# Patient Record
Sex: MALE | Race: WHITE | NOT HISPANIC OR LATINO | Employment: FULL TIME | URBAN - METROPOLITAN AREA
[De-identification: names, ages, dates, MRNs, and addresses within clinical notes are randomized per-mention and may not be internally consistent; named-entity substitution may affect disease eponyms.]

---

## 2018-05-20 ENCOUNTER — APPOINTMENT (OUTPATIENT)
Dept: GENERAL RADIOLOGY | Facility: CLINIC | Age: 53
End: 2018-05-20
Attending: EMERGENCY MEDICINE
Payer: COMMERCIAL

## 2018-05-20 ENCOUNTER — HOSPITAL ENCOUNTER (EMERGENCY)
Facility: CLINIC | Age: 53
Discharge: HOME | End: 2018-05-20
Attending: EMERGENCY MEDICINE | Admitting: EMERGENCY MEDICINE
Payer: COMMERCIAL

## 2018-05-20 VITALS
OXYGEN SATURATION: 98 % | WEIGHT: 235.89 LBS | RESPIRATION RATE: 18 BRPM | DIASTOLIC BLOOD PRESSURE: 119 MMHG | TEMPERATURE: 99.7 F | SYSTOLIC BLOOD PRESSURE: 166 MMHG | BODY MASS INDEX: 29.48 KG/M2

## 2018-05-20 DIAGNOSIS — J06.9 VIRAL URI WITH COUGH: ICD-10-CM

## 2018-05-20 DIAGNOSIS — L03.115 CELLULITIS OF FOOT WITHOUT TOES, RIGHT: ICD-10-CM

## 2018-05-20 PROCEDURE — 73630 X-RAY EXAM OF FOOT: CPT | Mod: RT

## 2018-05-20 PROCEDURE — 71046 X-RAY EXAM CHEST 2 VIEWS: CPT

## 2018-05-20 PROCEDURE — 99284 EMERGENCY DEPT VISIT MOD MDM: CPT | Mod: 25

## 2018-05-20 RX ORDER — NAPROXEN 500 MG/1
500 TABLET ORAL 2 TIMES DAILY WITH MEALS
Qty: 14 TABLET | Refills: 0 | Status: SHIPPED | OUTPATIENT
Start: 2018-05-20 | End: 2018-05-27

## 2018-05-20 RX ORDER — CEPHALEXIN 500 MG/1
500 CAPSULE ORAL 4 TIMES DAILY
Qty: 40 CAPSULE | Refills: 0 | Status: SHIPPED | OUTPATIENT
Start: 2018-05-20 | End: 2018-05-30

## 2018-05-20 ASSESSMENT — ENCOUNTER SYMPTOMS
FEVER: 1
RHINORRHEA: 1
COUGH: 1
SHORTNESS OF BREATH: 1
ABDOMINAL PAIN: 0
CHILLS: 1
ARTHRALGIAS: 1
DIFFICULTY URINATING: 0

## 2018-05-20 NOTE — ED AVS SNAPSHOT
[facility] Emergency Department    [address]    [facility] [address]    Phone:  [phone]    Fax:  [phone]                                       aMrk Roy   MRN: [id number]    Department:  [facility] Emergency Department   Date of Visit:  5/20/2018           After Visit Summary Signature Page     I have received my discharge instructions, and my questions have been answered. I have discussed any challenges I see with this plan with the nurse or doctor.    ..........................................................................................................................................  Patient/Patient Representative Signature      ..........................................................................................................................................  Patient Representative Print Name and Relationship to Patient    ..................................................               ................................................  Date                                            Time    ..........................................................................................................................................  Reviewed by Signature/Title    ...................................................              ..............................................  Date                                                            Time

## 2018-05-20 NOTE — ED TRIAGE NOTES
Pt presents with off and on fever since Tuesday and R foot pain since Wednesday. Pt also endorses a cough as well with productive sputum. States is having R foot swelling and it feels like he is walking on glass. ABCs intact.

## 2018-05-20 NOTE — LETTER
May 20, 2018      To Whom It May Concern:      Mark Roy was seen in our Emergency Department today, 05/20/18.  I expect his condition to improve over the next 2-3 days.  He may return to work when improved.    Sincerely,        Stas Gonzalez MD

## 2018-05-20 NOTE — ED AVS SNAPSHOT
[facility] Emergency Department    [address]    [facility] [address]    Phone:  [phone]    Fax:  [phone]                                       Mark Roy   MRN: [id number]    Department:  [facility] Emergency Department   Date of Visit:  5/20/2018           Patient Information     Date Of Birth          1965        Your diagnoses for this visit were:     Viral URI with cough     Cellulitis of foot without toes, right        You were seen by Stas Gonzalez MD.      Follow-up Information     Follow up with Kana Tapia MD. Schedule an appointment as soon as possible for a visit in 3 days.    Specialty:  Internal Medicine    Why:  To recheck the infection and make sure it is healing.    Contact information:    [address]  [facility] [address]  [phone]          Go to [facility] Emergency Department.    Specialty:  EMERGENCY MEDICINE    Why:  If symptoms worsen    Contact information:    [address]  [facility] [id number] [phone]        Discharge Instructions         Cellulitis  Cellulitis is an infection of the deep layers of skin. A break in the skin, such as a cut or scratch, can let bacteria under the skin. If the bacteria get to deep layers of the skin, it can be serious. If not treated, cellulitis can get into the bloodstream and lymph nodes. The infection can then spread throughout the body. This causes serious illness.  Cellulitis causes the affected skin to become red, swollen, warm, and sore. The reddened areas have a visible border. An open sore may leak fluid (pus). You may have a fever, chills, and pain.  Cellulitis is treated with antibiotics taken for 7 to 10 days. An open sore may be cleaned and covered with cool wet gauze. Symptoms should get better 1 to 2 days after treatment is started. Make sure to take all the antibiotics for the full number of days until they are gone. Keep  taking the medicine even if your symptoms go away.  Home care  Follow these tips:    Limit the use of the part of your body with cellulitis.     If the infection is on your leg, keep your leg raised while sitting. This will help to reduce swelling.    Take all of the antibiotic medicine exactly as directed until it is gone. Do not miss any doses, especially during the first 7 days. Don t stop taking the medicine when your symptoms get better.    Keep the affected area clean and dry.    Wash your hands with soap and warm water before and after touching your skin. Anyone else who touches your skin should also wash his or her hands. Don't share towels.  Follow-up care  Follow up with your healthcare provider, or as advised. If your infection does not go away on the first antibiotic, your healthcare provider will prescribe a different one.  When to seek medical advice  Call your healthcare provider right away if any of these occur:    Red areas that spread    Swelling or pain that gets worse    Fluid leaking from the skin (pus)    Fever higher of 100.4  F (38.0  C) or higher after 2 days on antibiotics  Date Last Reviewed: 9/1/2016 2000-2017 [employer]. [address] [place] [address]. All rights reserved. This information is not intended as a substitute for professional medical care. Always follow your healthcare professional's instructions.      Discharge Instructions  Upper Respiratory Infection    The upper respiratory tract includes the sinuses, nasal passages, pharynx, and larynx. A URI, or upper respiratory infection, is an infection of any of the parts of the upper airway. Symptoms include runny nose, congestion, sneezing, sore throat, cough, and fever. URIs are almost always caused by a virus. Antibiotics do not help with viral infections, so are generally not prescribed. A URI is very contagious through coughing and nasal secretions; make sure you wash your hands often and clean  surfaces after sneezing, coughing or touching them. While you should start to improve in 3 - 5 days, remember that sometimes a cough can linger for several weeks.    Generally, every Emergency Department visit should have a follow-up clinic visit with either a primary or a specialty clinic/provider. Please follow-up as instructed by your emergency provider today.    Return to the Emergency Department if:    Any of your symptoms get much worse.    You seem very sick, like being too weak to get up.    You have chest pain or shortness of breath.     You have a severe headache.    You are vomiting (throwing up) so much you cannot keep fluids or medicines down.    You have confusion or seem unusually drowsy.    You have a seizure.    What can I do to help myself?    Fill any prescriptions the provider gave you and take them right away    If you have a fever, get plenty of rest and drink lots of fluids, especially water.    Using a humidifier or saline nose spray will also help loosen mucous.     Clothes or blankets will not change your fever. Do what is comfortable for you.    Bathing or sponging in lukewarm water may help you feel better.    Acetaminophen (Tylenol ) or ibuprofen (Advil , Motrin ) will help bring fever down and may help you feel more comfortable. Be sure to read and follow the package directions, and ask your provider if you have questions.    Do not drink alcohol.    Decongestants may help you feel better. You may use decongestant nose sprays Afrin  (oxymetazoline) or Hussein-Synephrine  (phenylephrine hydrochloride) for up to 3 days, or may use a decongestant tablet like Sudafed  (pseudoephedrine).  If you were given a prescription for medicine here today, be sure to read all of the information (including the package insert) that comes with your prescription.  This will include important information about the medicine, its side effects, and any warnings that you need to know about.  The pharmacist who  fills the prescription can provide more information and answer questions you may have about the medicine.  If you have questions or concerns that the pharmacist cannot address, please call or return to the Emergency Department.   Remember that you can always come back to the Emergency Department if you are not able to see your regular provider in the amount of time listed above, if you get any new symptoms, or if there is anything that worries you.      24 Hour Appointment Hotline       To make an appointment at any [facility], call [phone] ([phone]). If you don't have a family doctor or clinic, we will help you find one. [facility] are conveniently located to serve the needs of you and your family.             Review of your medicines      START taking        Dose / Directions Last dose taken    cephALEXin 500 MG capsule   Commonly known as:  KEFLEX   Dose:  500 mg   Quantity:  40 capsule        Take 1 capsule (500 mg) by mouth 4 times daily for 10 days   Refills:  0        naproxen 500 MG tablet   Commonly known as:  NAPROSYN   Dose:  500 mg   Quantity:  14 tablet        Take 1 tablet (500 mg) by mouth 2 times daily (with meals) for 7 days   Refills:  0          Our records show that you are taking the medicines listed below. If these are incorrect, please call your family doctor or clinic.        Dose / Directions Last dose taken    HYDROcodone-acetaminophen 5-325 MG per tablet   Commonly known as:  NORCO        Refills:  0        NO ACTIVE MEDICATIONS        Refills:  0                Prescriptions were sent or printed at these locations (2 Prescriptions)                   Other Prescriptions                Printed at Department/Unit printer (2 of 2)         cephALEXin (KEFLEX) 500 MG capsule               naproxen (NAPROSYN) 500 MG tablet                Procedures and tests performed during your visit     Foot  XR, G/E 3 views, right    XR Chest 2 Views      Orders Needing Specimen  Collection     None      Pending Results     No orders found from 5/18/2018 to 5/21/2018.            Pending Culture Results     No orders found from 5/18/2018 to 5/21/2018.            Pending Results Instructions     If you had any lab results that were not finalized at the time of your Discharge, you can call the ED Lab Result RN at [phone]. You will be contacted by this team for any positive Lab results or changes in treatment. The nurses are available 7 days a week from 10A to 6:30P.  You can leave a message 24 hours per day and they will return your call.        Test Results From Your Hospital Stay        5/20/2018  7:22 PM      Narrative     FOOT RIGHT THREE OR MORE VIEWS   5/20/2018 6:33 PM     HISTORY: Pain, swelling, erythema, history of metal foreign body to  bottom of lateral foot.     COMPARISON: None.        Impression     IMPRESSION: Negative exam for radiopaque foreign body. Minimal  degenerative change seen in the first metatarsophalangeal joint and in  the midfoot. Plantar fascia spur also noted. No evidence for any bony  erosions.    JONNY MAN MD         5/20/2018  7:22 PM      Narrative     CHEST TWO VIEWS  5/20/2018 6:33 PM     HISTORY: Cough, fever.     COMPARISON: None.        Impression     IMPRESSION: Normal.    JONNY MAN MD                Clinical Quality Measure: Blood Pressure Screening     Your blood pressure was checked while you were in the emergency department today. The last reading we obtained was  BP: (!) 166/119 . Please read the guidelines below about what these numbers mean and what you should do about them.  If your systolic blood pressure (the top number) is less than 120 and your diastolic blood pressure (the bottom number) is less than 80, then your blood pressure is normal. There is nothing more that you need to do about it.  If your systolic blood pressure (the top number) is 120-139 or your diastolic blood pressure (the bottom number) is 80-89, your blood  pressure may be higher than it should be. You should have your blood pressure rechecked within a year by a primary care provider.  If your systolic blood pressure (the top number) is 140 or greater or your diastolic blood pressure (the bottom number) is 90 or greater, you may have high blood pressure. High blood pressure is treatable, but if left untreated over time it can put you at risk for heart attack, stroke, or kidney failure. You should have your blood pressure rechecked by a primary care provider within the next 4 weeks.  If your provider in the emergency department today gave you specific instructions to follow-up with your doctor or provider even sooner than that, you should follow that instruction and not wait for up to 4 weeks for your follow-up visit.        Thank you for choosing [facility]       Thank you for choosing [facility] for your care. Our goal is always to provide you with excellent care. Hearing back from our patients is one way we can continue to improve our services. Please take a few minutes to complete the written survey that you may receive in the mail after you visit with us. Thank you!        "[employer] Information     [employer] gives you secure access to your electronic health record. If you see a primary care provider, you can also send messages to your care team and make appointments. If you have questions, please call your primary care clinic.  If you do not have a primary care provider, please call [phone] and they will assist you.        Care EveryWhere ID     This is your Care EveryWhere ID. This could be used by other organizations to access your [facility] medical records  [id number]        Equal Access to Services     CLAUDIA PIERSON : Hadii arvin Yoo, butch khan, toña grady. So [facility] [phone].    ATENCIÓN: Si habla español, tiene a hickey disposición servicios gratuitos de asistencia lingüística. Llame al  [phone].    We comply with applicable federal civil rights laws and [place] laws. We do not discriminate on the basis of race, color, national origin, age, disability, sex, sexual orientation, or gender identity.            After Visit Summary       This is your record. Keep this with you and show to your community pharmacist(s) and doctor(s) at your next visit.

## 2018-05-20 NOTE — ED PROVIDER NOTES
"  History     Chief Complaint:  Fever and Foot Pain    HPI   Mark Roy is a 52 year old male who presents to the emergency department today with fever and foot pain. The patient reports that he started out with a cold, which progressed to fever on Tuesday (5 days ago). The cold was associated with \"skin\" and back pain, chills, and cough with productive sputum, runny nose, and sore throat.  He also reports some shortness of breath associated with being on his feet. At it's highest the fever was 101.4 last night. Currently he is at 99.7. He then started having right foot pain 4 days ago. He rates his pain as 7/10 and his right foot looks swollen and red. He does have a history of what he feels is gout but this has never been formally diagnosed, that he relates to eating some foods. He denies chest pain. He denies abdominal pain, urinary difficulty, bowel problems.     Later on the patient revealed that he got a small metal sliver into his right foot about a week ago. He is not sure if the sliver is still in the there.     Allergies:  No Known Drug Allergies     Medications:    Norco    Past Medical History:    Right shoulder pain    Past Surgical History:    Colonoscopy  Hernia repair   Rotator cuff repair     Family History:    Hypertension  Cancer  Heart disease     Social History:  The patient was alone.  Smoking Status: Never  Smokeless Tobacco: Never  Alcohol Use: 0.0 oz/week    Marital Status:       Review of Systems   Constitutional: Positive for chills and fever.   HENT: Positive for congestion and rhinorrhea. Negative for ear pain.    Respiratory: Positive for cough (productive) and shortness of breath.    Cardiovascular: Positive for leg swelling (in the feet). Negative for chest pain.   Gastrointestinal: Negative for abdominal pain.   Genitourinary: Negative for difficulty urinating.   Musculoskeletal: Positive for arthralgias (right foot pain).   All other systems reviewed and are " negative.    Physical Exam     Patient Vitals for the past 24 hrs:   BP Temp Temp src Heart Rate Resp SpO2 Weight   05/20/18 1733 (!) 166/119 99.7  F (37.6  C) Temporal 127 18 98 % 107 kg (235 lb 14.3 oz)      Physical Exam  General: Well appearing, nontoxic. Resting comfortably  Head:  Scalp, face, and head appear normal  Eyes:  Pupils are equal, round, and reactive to light    Conjunctivae non-injected and sclerae white  ENT:    The external nose is normal    Pinnae are normal. Bilateral TMs clear without erythema, bulging, or effusion. Auditory canals normal.     The oropharynx is normal, mucous membranes moist. Posterior pharynx clear without swelling, exudates or erythema     Uvula is in the midline  Neck:  Normal range of motion    There is no rigidity noted    Trachea is in the midline  CV:  Regular rate and rhythm     Normal S1/S2, no S3/S4    No murmur or rub  Resp:  Lungs are clear and equal bilaterally    There is no tachypnea    No increased work of breathing    No rales, wheezing, or rhonchi  GI:  Abdomen is soft, no rigidity or guarding    No distension, or mass    No tenderness or rebound tenderness   MS:  Normal muscular tone.     Right foot with significant warmth and erythema as well as swelling over the majority of the dorsum of the foot excluding the toes and extending up the lateral ankle covering the lateral malleolus.  This area is diffusely mildly tender to palpation.  The sole of the foot does not reveal any wounds, embedded foreign bodies.  There is no focal areas of fluctuance appreciated.  The remainder of the bilateral lower legs, knees are unremarkable.  The left foot has no swelling but there is mild area of erythema over the second MTP joint and dorsum of the foot which is tender to palpation.  There is no evidence of injury or deformity to the bilateral feet.    Symmetric motor strength    No lower extremity edema  Skin:  No rash or acute skin lesions noted, except as noted  above  Neuro:  Awake and alert    Speech is normal and fluent    Moves all extremities spontaneously  Psych: Normal affect.  Appropriate interactions.           Emergency Department Course   Imaging:  Radiology findings were communicated with the patient who voiced understanding of the findings.  Chest XR  IMPRESSION: Normal.  Report per radiology     Foot XR     IMPRESSION: Negative exam for radiopaque foreign body. Minimal degenerative change seen in the first metatarsophalangeal joint and in the midfoot. Plantar fascia spur also noted. No evidence for any bony erosions.  Report per radiology      Emergency Department Course:  Nursing notes and vitals reviewed.  1807: I performed an exam of the patient as documented above.   The patient was sent for a Chest XR and Foot XR  while in the emergency department, results above.   1920: Findings and plan explained to the Patient. Patient discharged home with instructions regarding supportive care, medications, and reasons to return. The importance of close follow-up was reviewed. The patient was prescribed Keflex and Naprosyn.    I personally reviewed the imaging results with the Patient and answered all related questions prior to discharge.     Impression & Plan    Medical Decision Making:  Mark Roy is a 52 year old male who presents for evaluation of fever.  Patient reports URI type symptoms as well as cough and mild shortness of breath is been intermittent.  This is likely from a viral URI however pneumonia was considered therefore chest x-ray was obtained and negative for any pulmonary infiltrates or consolidation it would be consistent with pneumonia.  In addition patient is breathing comfortably with no hypoxia.  In addition to his respiratory symptoms the patient did report getting a metal sliver stuck in the bottom of the right foot which he thinks he was able to remove however since then has had worsening right foot pain, redness swelling and warmth.  Exam  is consistent with cellulitis.  There is no evidence of abscess.  Given the concern for possible foreign body x-rays of the foot were obtained and did not reveal any definite radiopaque foreign body.  The area of redness and erythema was outlined patient will be started on antibiotics given his fevers.  He has no evidence of systemic sepsis at this time.  Recommended treatment with elevation of the foot.  Naproxen for pain.  And antibiotics.  Patient reports he thinks he has an undiagnosed history of gout as eating shrimp and certain meats causes him pain in the feet however his exam is not consistent with gout at this time, and I would not expect gout to cause fever or systemic illness. Recommended close outpatient follow-up in 2-3 days to ensure that the area of cellulitis is healing and improved.  Close return precautions are provided.  Patient was instructed to return immediately for any worsening in his condition.    Diagnosis:    ICD-10-CM    1. Viral URI with cough J06.9     B97.89    2. Cellulitis of foot without toes, right L03.115        Disposition:  discharged to home    Discharge Medications:  Discharge Medication List as of 5/20/2018  7:33 PM      START taking these medications    Details   cephALEXin (KEFLEX) 500 MG capsule Take 1 capsule (500 mg) by mouth 4 times daily for 10 days, Disp-40 capsule, R-0, Local Print      naproxen (NAPROSYN) 500 MG tablet Take 1 tablet (500 mg) by mouth 2 times daily (with meals) for 7 days, Disp-14 tablet, R-0, Local Print             Scribe Disclosure:  Rufina FATIMA, am serving as a scribe at 6:06 PM on 5/20/2018 to document services personally performed by Stas Gonzalez MD based on my observations and the provider's statements to me.    5/20/2018   [facility] [place] [facility] EMERGENCY DEPARTMENT       Stas Gonzalez MD  05/21/18 8218

## 2018-05-21 NOTE — DISCHARGE INSTRUCTIONS
Cellulitis  Cellulitis is an infection of the deep layers of skin. A break in the skin, such as a cut or scratch, can let bacteria under the skin. If the bacteria get to deep layers of the skin, it can be serious. If not treated, cellulitis can get into the bloodstream and lymph nodes. The infection can then spread throughout the body. This causes serious illness.  Cellulitis causes the affected skin to become red, swollen, warm, and sore. The reddened areas have a visible border. An open sore may leak fluid (pus). You may have a fever, chills, and pain.  Cellulitis is treated with antibiotics taken for 7 to 10 days. An open sore may be cleaned and covered with cool wet gauze. Symptoms should get better 1 to 2 days after treatment is started. Make sure to take all the antibiotics for the full number of days until they are gone. Keep taking the medicine even if your symptoms go away.  Home care  Follow these tips:    Limit the use of the part of your body with cellulitis.     If the infection is on your leg, keep your leg raised while sitting. This will help to reduce swelling.    Take all of the antibiotic medicine exactly as directed until it is gone. Do not miss any doses, especially during the first 7 days. Don t stop taking the medicine when your symptoms get better.    Keep the affected area clean and dry.    Wash your hands with soap and warm water before and after touching your skin. Anyone else who touches your skin should also wash his or her hands. Don't share towels.  Follow-up care  Follow up with your healthcare provider, or as advised. If your infection does not go away on the first antibiotic, your healthcare provider will prescribe a different one.  When to seek medical advice  Call your healthcare provider right away if any of these occur:    Red areas that spread    Swelling or pain that gets worse    Fluid leaking from the skin (pus)    Fever higher of 100.4  F (38.0  C) or higher after 2 days  on antibiotics  Date Last Reviewed: 9/1/2016 2000-2017 [employer]. [address] [place] [address], [place] [address]. All rights reserved. This information is not intended as a substitute for professional medical care. Always follow your healthcare professional's instructions.      Discharge Instructions  Upper Respiratory Infection    The upper respiratory tract includes the sinuses, nasal passages, pharynx, and larynx. A URI, or upper respiratory infection, is an infection of any of the parts of the upper airway. Symptoms include runny nose, congestion, sneezing, sore throat, cough, and fever. URIs are almost always caused by a virus. Antibiotics do not help with viral infections, so are generally not prescribed. A URI is very contagious through coughing and nasal secretions; make sure you wash your hands often and clean surfaces after sneezing, coughing or touching them. While you should start to improve in 3 - 5 days, remember that sometimes a cough can linger for several weeks.    Generally, every Emergency Department visit should have a follow-up clinic visit with either a primary or a specialty clinic/provider. Please follow-up as instructed by your emergency provider today.    Return to the Emergency Department if:    Any of your symptoms get much worse.    You seem very sick, like being too weak to get up.    You have chest pain or shortness of breath.     You have a severe headache.    You are vomiting (throwing up) so much you cannot keep fluids or medicines down.    You have confusion or seem unusually drowsy.    You have a seizure.    What can I do to help myself?    Fill any prescriptions the provider gave you and take them right away    If you have a fever, get plenty of rest and drink lots of fluids, especially water.    Using a humidifier or saline nose spray will also help loosen mucous.     Clothes or blankets will not change your fever. Do what is comfortable for you.    Bathing or  sponging in lukewarm water may help you feel better.    Acetaminophen (Tylenol ) or ibuprofen (Advil , Motrin ) will help bring fever down and may help you feel more comfortable. Be sure to read and follow the package directions, and ask your provider if you have questions.    Do not drink alcohol.    Decongestants may help you feel better. You may use decongestant nose sprays Afrin  (oxymetazoline) or Hussein-Synephrine  (phenylephrine hydrochloride) for up to 3 days, or may use a decongestant tablet like Sudafed  (pseudoephedrine).  If you were given a prescription for medicine here today, be sure to read all of the information (including the package insert) that comes with your prescription.  This will include important information about the medicine, its side effects, and any warnings that you need to know about.  The pharmacist who fills the prescription can provide more information and answer questions you may have about the medicine.  If you have questions or concerns that the pharmacist cannot address, please call or return to the Emergency Department.   Remember that you can always come back to the Emergency Department if you are not able to see your regular provider in the amount of time listed above, if you get any new symptoms, or if there is anything that worries you.

## 2019-09-29 ENCOUNTER — HEALTH MAINTENANCE LETTER (OUTPATIENT)
Age: 54
End: 2019-09-29

## 2021-01-14 ENCOUNTER — HEALTH MAINTENANCE LETTER (OUTPATIENT)
Age: 56
End: 2021-01-14

## 2021-10-23 ENCOUNTER — HEALTH MAINTENANCE LETTER (OUTPATIENT)
Age: 56
End: 2021-10-23

## 2022-02-12 ENCOUNTER — HEALTH MAINTENANCE LETTER (OUTPATIENT)
Age: 57
End: 2022-02-12

## 2022-10-10 ENCOUNTER — HEALTH MAINTENANCE LETTER (OUTPATIENT)
Age: 57
End: 2022-10-10

## 2023-02-07 ENCOUNTER — OFFICE VISIT (OUTPATIENT)
Dept: INTERNAL MEDICINE | Facility: CLINIC | Age: 58
End: 2023-02-07
Payer: COMMERCIAL

## 2023-02-07 VITALS
WEIGHT: 238.5 LBS | TEMPERATURE: 98 F | SYSTOLIC BLOOD PRESSURE: 190 MMHG | RESPIRATION RATE: 18 BRPM | BODY MASS INDEX: 29.04 KG/M2 | DIASTOLIC BLOOD PRESSURE: 96 MMHG | OXYGEN SATURATION: 98 % | HEIGHT: 76 IN | HEART RATE: 92 BPM

## 2023-02-07 DIAGNOSIS — Z01.818 PREOP GENERAL PHYSICAL EXAM: Primary | ICD-10-CM

## 2023-02-07 DIAGNOSIS — S46.002A ROTATOR CUFF INJURY, LEFT, INITIAL ENCOUNTER: ICD-10-CM

## 2023-02-07 DIAGNOSIS — I10 BENIGN ESSENTIAL HYPERTENSION: ICD-10-CM

## 2023-02-07 LAB
ALBUMIN SERPL BCG-MCNC: 4.2 G/DL (ref 3.5–5.2)
ALP SERPL-CCNC: 107 U/L (ref 40–129)
ALT SERPL W P-5'-P-CCNC: 67 U/L (ref 10–50)
ANION GAP SERPL CALCULATED.3IONS-SCNC: 16 MMOL/L (ref 7–15)
AST SERPL W P-5'-P-CCNC: 62 U/L (ref 10–50)
BILIRUB SERPL-MCNC: 1.1 MG/DL
BUN SERPL-MCNC: 19.2 MG/DL (ref 6–20)
CALCIUM SERPL-MCNC: 9.6 MG/DL (ref 8.6–10)
CHLORIDE SERPL-SCNC: 98 MMOL/L (ref 98–107)
CREAT SERPL-MCNC: 0.87 MG/DL (ref 0.67–1.17)
DEPRECATED HCO3 PLAS-SCNC: 20 MMOL/L (ref 22–29)
ERYTHROCYTE [DISTWIDTH] IN BLOOD BY AUTOMATED COUNT: 13 % (ref 10–15)
GFR SERPL CREATININE-BSD FRML MDRD: >90 ML/MIN/1.73M2
GLUCOSE SERPL-MCNC: 188 MG/DL (ref 70–99)
HCT VFR BLD AUTO: 46.4 % (ref 40–53)
HGB BLD-MCNC: 15.5 G/DL (ref 13.3–17.7)
MCH RBC QN AUTO: 32.1 PG (ref 26.5–33)
MCHC RBC AUTO-ENTMCNC: 33.4 G/DL (ref 31.5–36.5)
MCV RBC AUTO: 96 FL (ref 78–100)
PLATELET # BLD AUTO: 166 10E3/UL (ref 150–450)
POTASSIUM SERPL-SCNC: 4 MMOL/L (ref 3.4–5.3)
PROT SERPL-MCNC: 7.6 G/DL (ref 6.4–8.3)
RBC # BLD AUTO: 4.83 10E6/UL (ref 4.4–5.9)
SODIUM SERPL-SCNC: 134 MMOL/L (ref 136–145)
WBC # BLD AUTO: 6.4 10E3/UL (ref 4–11)

## 2023-02-07 PROCEDURE — 36415 COLL VENOUS BLD VENIPUNCTURE: CPT | Performed by: PHYSICIAN ASSISTANT

## 2023-02-07 PROCEDURE — 80053 COMPREHEN METABOLIC PANEL: CPT | Performed by: PHYSICIAN ASSISTANT

## 2023-02-07 PROCEDURE — 99203 OFFICE O/P NEW LOW 30 MIN: CPT | Performed by: PHYSICIAN ASSISTANT

## 2023-02-07 PROCEDURE — 85027 COMPLETE CBC AUTOMATED: CPT | Performed by: PHYSICIAN ASSISTANT

## 2023-02-07 RX ORDER — LISINOPRIL 10 MG/1
10 TABLET ORAL DAILY
Qty: 30 TABLET | Refills: 2 | Status: SHIPPED | OUTPATIENT
Start: 2023-02-07 | End: 2023-03-06

## 2023-02-07 NOTE — PATIENT INSTRUCTIONS
For informational purposes only. Not to replace the advice of your health care provider. Copyright   2003,  [facility] [employer] [facility]. All rights reserved. Clinically reviewed by Joslyn Garcia MD. [employer] [id number] - REV .  Preparing for Your Surgery  Getting started  A nurse will call you to review your health history and instructions. They will give you an arrival time based on your scheduled surgery time. Please be ready to share:    Your doctor's clinic name and phone number    Your medical, surgical, and anesthesia history    A list of allergies and sensitivities    A list of medicines, including herbal treatments and over-the-counter drugs    Whether the patient has a legal guardian (ask how to send us the papers in advance)  Please tell us if you're pregnant--or if there's any chance you might be pregnant. Some surgeries may injure a fetus (unborn baby), so they require a pregnancy test. Surgeries that are safe for a fetus don't always need a test, and you can choose whether to have one.   If you have a child who's having surgery, please ask for a copy of Preparing for Your Child's Surgery.    Preparing for surgery    Within 10 to 30 days of surgery: Have a pre-op exam (sometimes called an H&P, or History and Physical). This can be done at a clinic or pre-operative center.  ? If you're having a , you may not need this exam. Talk to your care team.    At your pre-op exam, talk to your care team about all medicines you take. If you need to stop any medicines before surgery, ask when to start taking them again.  ? We do this for your safety. Many medicines can make you bleed too much during surgery. Some change how well surgery (anesthesia) drugs work.    Call your insurance company to let them know you're having surgery. (If you don't have insurance, call [phone].)    Call your clinic if there's any change in your health. This includes signs of a cold or flu (sore throat, runny nose,  cough, rash, fever). It also includes a scrape or scratch near the surgery site.    If you have questions on the day of surgery, call your hospital or surgery center.  Eating and drinking guidelines  For your safety: Unless your surgeon tells you otherwise, follow the guidelines below.    Eat and drink as usual until 8 hours before you arrive for surgery. After that, no food or milk.    Drink clear liquids until 2 hours before you arrive. These are liquids you can see through, like water, Gatorade, and Propel Water. They also include plain black coffee and tea (no cream or milk), candy, and breath mints. You can spit out gum when you arrive.    If you drink alcohol: Stop drinking it the night before surgery.    If your care team tells you to take medicine on the morning of surgery, it's okay to take it with a sip of water.  Preventing infection    Shower or bathe the night before and morning of your surgery. Follow the instructions your clinic gave you. (If no instructions, use regular soap.)    Don't shave or clip hair near your surgery site. We'll remove the hair if needed.    Don't smoke or vape the morning of surgery. You may chew nicotine gum up to 2 hours before surgery. A nicotine patch is okay.  ? Note: Some surgeries require you to completely quit smoking and nicotine. Check with your surgeon.    Your care team will make every effort to keep you safe from infection. We will:  ? Clean our hands often with soap and water (or an alcohol-based hand rub).  ? Clean the skin at your surgery site with a special soap that kills germs.  ? Give you a special gown to keep you warm. (Cold raises the risk of infection.)  ? Wear special hair covers, masks, gowns and gloves during surgery.  ? Give antibiotic medicine, if prescribed. Not all surgeries need antibiotics.  What to bring on the day of surgery    Photo ID and insurance card    Copy of your health care directive, if you have one    Glasses and hearing aids (bring  cases)  ? You can't wear contacts during surgery    Inhaler and eye drops, if you use them (tell us about these when you arrive)    CPAP machine or breathing device, if you use them    A few personal items, if spending the night    If you have . . .  ? A pacemaker, ICD (cardiac defibrillator) or other implant: Bring the ID card.  ? An implanted stimulator: Bring the remote control.  ? A legal guardian: Bring a copy of the certified (court-stamped) guardianship papers.  Please remove any jewelry, including body piercings. Leave jewelry and other valuables at home.  If you're going home the day of surgery    You must have a responsible adult drive you home. They should stay with you overnight as well.    If you don't have someone to stay with you, and you aren't safe to go home alone, we may keep you overnight. Insurance often won't pay for this.  After surgery  If it's hard to control your pain or you need more pain medicine, please call your surgeon's office.  Questions?   If you have any questions for your care team, list them here: _________________________________________________________________________________________________________________________________________________________________________ ____________________________________ ____________________________________ ____________________________________

## 2023-02-07 NOTE — PROGRESS NOTES
[address] [address]  Phone: [phone]  Primary Provider: Kana Tapia  Pre-op Performing Provider: AYLA CONDE      PREOPERATIVE EVALUATION:  Today's date: 2/7/2023    Mark Roy is a 57 year old male who presents for a preoperative evaluation.    Surgical Information:  Surgery/Procedure: Left rotator cuff repair  Surgery Location: [facility]  Surgeon: Dr Brothers  Surgery Date: 02/21/2023  Time of Surgery: TBD  Where patient plans to recover: At home with family  Fax number for surgical facility: [phone]    Type of Anesthesia Anticipated: General    Assessment & Plan     The proposed surgical procedure is considered INTERMEDIATE risk.    Preop general physical exam    - CBC with platelets; Future  - Comprehensive metabolic panel (BMP + Alb, Alk Phos, ALT, AST, Total. Bili, TP); Future    Rotator cuff injury, left, initial encounter    - CBC with platelets; Future  - Comprehensive metabolic panel (BMP + Alb, Alk Phos, ALT, AST, Total. Bili, TP); Future    Benign essential hypertension    - CBC with platelets; Future  - Comprehensive metabolic panel (BMP + Alb, Alk Phos, ALT, AST, Total. Bili, TP); Future  - lisinopril (ZESTRIL) 10 MG tablet; Take 1 tablet (10 mg) by mouth daily           Risks and Recommendations:  The patient has the following additional risks and recommendations for perioperative complications:   - No identified additional risk factors other than previously addressed    Medication Instructions:  Patient is to take all scheduled medications on the day of surgery EXCEPT for modifications listed below:   - aspirin: Discontinue aspirin 7-10 days prior to procedure to reduce bleeding risk. It should be resumed postoperatively.    - ACE/ARB: May be continued on the day of surgery.    - ibuprofen (Advil, Motrin): HOLD 1 day before surgery.     RECOMMENDATION:  APPROVAL GIVEN to proceed with  proposed procedure pending review of diagnostic evaluation.  Patient will be seeing PCP for virtual visit to f/u on new dx of hypertension and start of medication  If BP normal and labs are good will Addend this pre op and refax to [facility]     BP normal on recheck- cleared for surgery 2/17/23            Subjective     HPI related to upcoming procedure: left rotator cuff tear    Preop Questions 2/6/2023   1. Have you ever had a heart attack or stroke? No   2. Have you ever had surgery on your heart or blood vessels, such as a stent placement, a coronary artery bypass, or surgery on an artery in your head, neck, heart, or legs? No   3. Do you have chest pain with activity? No   4. Do you have a history of  heart failure? No   5. Do you currently have a cold, bronchitis or symptoms of other infection? No   6. Do you have a cough, shortness of breath, or wheezing? No   7. Do you or anyone in your family have previous history of blood clots? No   8. Do you or does anyone in your family have a serious bleeding problem such as prolonged bleeding following surgeries or cuts? No   9. Have you ever had problems with anemia or been told to take iron pills? No   10. Have you had any abnormal blood loss such as black, tarry or bloody stools? No   11. Have you ever had a blood transfusion? No   12. Are you willing to have a blood transfusion if it is medically needed before, during, or after your surgery? Yes   13. Have you or any of your relatives ever had problems with anesthesia? No   14. Do you have sleep apnea, excessive snoring or daytime drowsiness? No   15. Do you have any artifical heart valves or other implanted medical devices like a pacemaker, defibrillator, or continuous glucose monitor? No   16. Do you have artificial joints? No   17. Are you allergic to latex? No       Health Care Directive:  Patient does not have a Health Care Directive or Living Will:     Preoperative Review of :   reviewed - no record of  controlled substances prescribed.      Status of Chronic Conditions:  See problem list for active medical problems.  Problems all longstanding and stable, except as noted/documented.  See ROS for pertinent symptoms related to these conditions.  New dx of HTN on exam today , review of chart shows last BP was elevated  Has not been seen by PCP since 2016 BP normal then       Review of Systems  CONSTITUTIONAL: NEGATIVE for fever, chills, change in weight  ENT/MOUTH: NEGATIVE for ear, mouth and throat problems  RESP: NEGATIVE for significant cough or SOB  CV: NEGATIVE for chest pain, palpitations or peripheral edema  GI: NEGATIVE for nausea, abdominal pain, heartburn, or change in bowel habits  NEURO: NEGATIVE for weakness, dizziness or paresthesias  ENDOCRINE: NEGATIVE for temperature intolerance, skin/hair changes  HEME/ALLERGY/IMMUNE: NEGATIVE for bleeding problems  PSYCHIATRIC: NEGATIVE for changes in mood or affect  ROS otherwise negative    Patient Active Problem List    Diagnosis Date Noted     Benign essential hypertension 02/07/2023     Priority: Medium     CARDIOVASCULAR SCREENING; LDL GOAL LESS THAN 160 11/30/2012     Priority: Medium      Past Medical History:   Diagnosis Date     Right shoulder pain     rotator cuff surgery     Past Surgical History:   Procedure Laterality Date     COLONOSCOPY N/A 08/19/2016    Procedure: COLONOSCOPY;  Surgeon: Elena Geiger MD;  Location: [facility]     HERNIA REPAIR      left inguinal     ROTATOR CUFF REPAIR RT/LT Right 05/01/2016     ROTATOR CUFF REPAIR RT/LT Right     2012and 2015     Current Outpatient Medications   Medication Sig Dispense Refill     lisinopril (ZESTRIL) 10 MG tablet Take 1 tablet (10 mg) by mouth daily 30 tablet 2     NO ACTIVE MEDICATIONS          No Known Allergies     Social History     Tobacco Use     Smoking status: Never     Smokeless tobacco: Never   Substance Use Topics     Alcohol use: Yes     Alcohol/week: 0.0 standard drinks      "Comment: socially       History   Drug Use No         Objective     BP (!) 190/96   Pulse 92   Temp 98  F (36.7  C) (Tympanic)   Resp 18   Ht 1.93 m (6' 4\")   Wt 108.2 kg (238 lb 8 oz)   SpO2 98%   BMI 29.03 kg/m      Physical Exam  GENERAL APPEARANCE: healthy, alert and no distress  HENT: ear canals and TM's normal and nose and mouth without ulcers or lesions  RESP: lungs clear to auscultation - no rales, rhonchi or wheezes  CV: regular rate and rhythm, normal S1 S2, no S3 or S4 and no murmur, click or rub   ABDOMEN: soft, nontender, no HSM or masses and bowel sounds normal  NEURO: Normal strength and tone, sensory exam grossly normal, mentation intact and speech normal  PSYCH: mentation appears normal and affect normal/bright    No results for input(s): HGB, PLT, INR, NA, POTASSIUM, CR, A1C in the last 20444 hours.     Diagnostics:  Labs pending at this time.  Results will be reviewed when available.   No EKG required, no history of coronary heart disease, significant arrhythmia, peripheral arterial disease or other structural heart disease.    Revised Cardiac Risk Index (RCRI):  The patient has the following serious cardiovascular risks for perioperative complications:   - No serious cardiac risks = 0 points     RCRI Interpretation: 0 points: Class I (very low risk - 0.4% complication rate)           Signed Electronically by: Lisette Floyd PA-C  Copy of this evaluation report is provided to requesting physician.      "

## 2023-02-14 ENCOUNTER — VIRTUAL VISIT (OUTPATIENT)
Dept: INTERNAL MEDICINE | Facility: CLINIC | Age: 58
End: 2023-02-14
Payer: COMMERCIAL

## 2023-02-14 DIAGNOSIS — I10 BENIGN ESSENTIAL HYPERTENSION: Primary | ICD-10-CM

## 2023-02-14 PROCEDURE — 99203 OFFICE O/P NEW LOW 30 MIN: CPT | Mod: VID | Performed by: INTERNAL MEDICINE

## 2023-02-14 NOTE — PROGRESS NOTES
"Mark is a 57 year old who is being evaluated via a billable video visit.      How would you like to obtain your AVS? MyChart  If the video visit is dropped, the invitation should be resent by: Send to e-mail at: [email or web address]  Will anyone else be joining your video visit? No          Assessment & Plan     Benign essential hypertension  BP is controlled on Lisinopril, started one week ago.   He is checking at home and last result is 136/84.   If fluctuating blood pressure the Lisinopril dose can be increased to 20 mg.                BMI:   Estimated body mass index is 29.03 kg/m  as calculated from the following:    Height as of 2/7/23: 1.93 m (6' 4\").    Weight as of 2/7/23: 108.2 kg (238 lb 8 oz).       See Patient Instructions    Return in about 2 months (around 4/14/2023) for Physical Exam.    Kana Tapia MD  [facility]    Jordin Flores is a 57 year old, presenting for the following health issues:  Follow Up (Recheck bp for clearance for surgery)      History of Present Illness       Reason for visit:  Follow-up of pre-surgery physical results  Symptom onset:  Today  Symptoms include:  High blood pressure  Symptom intensity:  Mild  Symptom progression:  Staying the same  Had these symptoms before:  No    He eats 2-3 servings of fruits and vegetables daily.He consumes 0 sweetened beverage(s) daily.He exercises with enough effort to increase his heart rate 10 to 19 minutes per day.  He exercises with enough effort to increase his heart rate 3 or less days per week.   He is taking medications regularly.       Patient was seen for pre surgical physical a week ago. His BP was elevated and patient was started on treatment with Lisinopril 10 mg daily in addition to low salt diet.   His BP is gradually decreasing to normal past 1 day. No side effects from the treatment. No dizziness, HA, SOB, CP or palpitations.   Has history of borderline diabetes. Not on treatment. Trying " to keep diet and exercise.       Review of Systems   Constitutional, HEENT, cardiovascular, pulmonary, gi and gu systems are negative, except as otherwise noted.      Objective           Vitals:  No vitals were obtained today due to virtual visit.    Physical Exam   GENERAL: Healthy, alert and no distress  EYES: Eyes grossly normal to inspection.  No discharge or erythema, or obvious scleral/conjunctival abnormalities.  RESP: No audible wheeze, cough, or visible cyanosis.  No visible retractions or increased work of breathing.    SKIN: Visible skin clear. No significant rash, abnormal pigmentation or lesions.  NEURO: Cranial nerves grossly intact.  Mentation and speech appropriate for age.  PSYCH: Mentation appears normal, affect normal/bright, judgement and insight intact, normal speech and appearance well-groomed.    Office Visit on 02/07/2023   Component Date Value Ref Range Status     WBC Count 02/07/2023 6.4  4.0 - 11.0 10e3/uL Final     RBC Count 02/07/2023 4.83  4.40 - 5.90 10e6/uL Final     Hemoglobin 02/07/2023 15.5  13.3 - 17.7 g/dL Final     Hematocrit 02/07/2023 46.4  40.0 - 53.0 % Final     MCV 02/07/2023 96  78 - 100 fL Final     MCH 02/07/2023 32.1  26.5 - 33.0 pg Final     MCHC 02/07/2023 33.4  31.5 - 36.5 g/dL Final     RDW 02/07/2023 13.0  10.0 - 15.0 % Final     Platelet Count 02/07/2023 166  150 - 450 10e3/uL Final     Sodium 02/07/2023 134 (L)  136 - 145 mmol/L Final     Potassium 02/07/2023 4.0  3.4 - 5.3 mmol/L Final     Chloride 02/07/2023 98  98 - 107 mmol/L Final     Carbon Dioxide (CO2) 02/07/2023 20 (L)  22 - 29 mmol/L Final     Anion Gap 02/07/2023 16 (H)  7 - 15 mmol/L Final     Urea Nitrogen 02/07/2023 19.2  6.0 - 20.0 mg/dL Final     Creatinine 02/07/2023 0.87  0.67 - 1.17 mg/dL Final     Calcium 02/07/2023 9.6  8.6 - 10.0 mg/dL Final     Glucose 02/07/2023 188 (H)  70 - 99 mg/dL Final     Alkaline Phosphatase 02/07/2023 107  40 - 129 U/L Final     AST 02/07/2023 62 (H)  10 - 50 U/L  Final     ALT 02/07/2023 67 (H)  10 - 50 U/L Final     Protein Total 02/07/2023 7.6  6.4 - 8.3 g/dL Final     Albumin 02/07/2023 4.2  3.5 - 5.2 g/dL Final     Bilirubin Total 02/07/2023 1.1  <=1.2 mg/dL Final     GFR Estimate 02/07/2023 >90  >60 mL/min/1.73m2 Final    eGFR calculated using 2021 CKD-EPI equation.               Video-Visit Details    Type of service:  Video Visit     Originating Location (pt. Location): Home    Distant Location (provider location):  On-site  Platform used for Video Visit: Earline

## 2023-03-05 DIAGNOSIS — I10 BENIGN ESSENTIAL HYPERTENSION: ICD-10-CM

## 2023-03-06 RX ORDER — LISINOPRIL 10 MG/1
TABLET ORAL
Qty: 90 TABLET | Refills: 3 | Status: SHIPPED | OUTPATIENT
Start: 2023-03-06 | End: 2024-04-24

## 2023-03-25 ENCOUNTER — HEALTH MAINTENANCE LETTER (OUTPATIENT)
Age: 58
End: 2023-03-25

## 2023-05-27 ASSESSMENT — ENCOUNTER SYMPTOMS
EYE PAIN: 0
MYALGIAS: 0
NERVOUS/ANXIOUS: 0
NAUSEA: 0
CHILLS: 0
SHORTNESS OF BREATH: 0
HEMATOCHEZIA: 0
HEARTBURN: 0
ABDOMINAL PAIN: 0
SORE THROAT: 0
HEADACHES: 0
HEMATURIA: 0
FREQUENCY: 0
DYSURIA: 0
WEAKNESS: 0
FEVER: 0
DIARRHEA: 0
JOINT SWELLING: 0
COUGH: 1
PARESTHESIAS: 0
DIZZINESS: 0
PALPITATIONS: 0
ARTHRALGIAS: 0
CONSTIPATION: 0

## 2023-05-31 ENCOUNTER — OFFICE VISIT (OUTPATIENT)
Dept: INTERNAL MEDICINE | Facility: CLINIC | Age: 58
End: 2023-05-31
Payer: COMMERCIAL

## 2023-05-31 ENCOUNTER — ANCILLARY PROCEDURE (OUTPATIENT)
Dept: GENERAL RADIOLOGY | Facility: CLINIC | Age: 58
End: 2023-05-31
Attending: INTERNAL MEDICINE
Payer: COMMERCIAL

## 2023-05-31 VITALS
WEIGHT: 230.8 LBS | OXYGEN SATURATION: 95 % | HEART RATE: 100 BPM | DIASTOLIC BLOOD PRESSURE: 96 MMHG | BODY MASS INDEX: 28.11 KG/M2 | SYSTOLIC BLOOD PRESSURE: 150 MMHG | HEIGHT: 76 IN | RESPIRATION RATE: 14 BRPM | TEMPERATURE: 97.7 F

## 2023-05-31 DIAGNOSIS — Z11.59 NEED FOR HEPATITIS C SCREENING TEST: ICD-10-CM

## 2023-05-31 DIAGNOSIS — Z11.4 SCREENING FOR HIV (HUMAN IMMUNODEFICIENCY VIRUS): ICD-10-CM

## 2023-05-31 DIAGNOSIS — R05.9 COUGH, UNSPECIFIED TYPE: ICD-10-CM

## 2023-05-31 DIAGNOSIS — Z00.00 ENCOUNTER FOR PREVENTATIVE ADULT HEALTH CARE EXAMINATION: Primary | ICD-10-CM

## 2023-05-31 DIAGNOSIS — Z00.00 ENCOUNTER FOR PREVENTATIVE ADULT HEALTH CARE EXAMINATION: ICD-10-CM

## 2023-05-31 DIAGNOSIS — Z12.5 SCREENING FOR PROSTATE CANCER: ICD-10-CM

## 2023-05-31 DIAGNOSIS — I10 BENIGN ESSENTIAL HYPERTENSION: ICD-10-CM

## 2023-05-31 LAB
ALBUMIN SERPL BCG-MCNC: 4.4 G/DL (ref 3.5–5.2)
ALP SERPL-CCNC: 128 U/L (ref 40–129)
ALT SERPL W P-5'-P-CCNC: 77 U/L (ref 10–50)
ANION GAP SERPL CALCULATED.3IONS-SCNC: 17 MMOL/L (ref 7–15)
AST SERPL W P-5'-P-CCNC: 63 U/L (ref 10–50)
BILIRUB SERPL-MCNC: 0.7 MG/DL
BUN SERPL-MCNC: 16.3 MG/DL (ref 6–20)
CALCIUM SERPL-MCNC: 9.7 MG/DL (ref 8.6–10)
CHLORIDE SERPL-SCNC: 101 MMOL/L (ref 98–107)
CHOLEST SERPL-MCNC: 226 MG/DL
CREAT SERPL-MCNC: 0.94 MG/DL (ref 0.67–1.17)
DEPRECATED HCO3 PLAS-SCNC: 20 MMOL/L (ref 22–29)
ERYTHROCYTE [DISTWIDTH] IN BLOOD BY AUTOMATED COUNT: 13.1 % (ref 10–15)
GFR SERPL CREATININE-BSD FRML MDRD: >90 ML/MIN/1.73M2
GLUCOSE SERPL-MCNC: 230 MG/DL (ref 70–99)
HCT VFR BLD AUTO: 45.8 % (ref 40–53)
HCV AB SERPL QL IA: NONREACTIVE
HDLC SERPL-MCNC: 53 MG/DL
HGB BLD-MCNC: 15.4 G/DL (ref 13.3–17.7)
HIV 1+2 AB+HIV1 P24 AG SERPL QL IA: NONREACTIVE
LDLC SERPL CALC-MCNC: 148 MG/DL
MCH RBC QN AUTO: 32.3 PG (ref 26.5–33)
MCHC RBC AUTO-ENTMCNC: 33.6 G/DL (ref 31.5–36.5)
MCV RBC AUTO: 96 FL (ref 78–100)
NONHDLC SERPL-MCNC: 173 MG/DL
PLATELET # BLD AUTO: 177 10E3/UL (ref 150–450)
POTASSIUM SERPL-SCNC: 4.6 MMOL/L (ref 3.4–5.3)
PROT SERPL-MCNC: 7.8 G/DL (ref 6.4–8.3)
PSA SERPL DL<=0.01 NG/ML-MCNC: 2.34 NG/ML (ref 0–3.5)
RBC # BLD AUTO: 4.77 10E6/UL (ref 4.4–5.9)
SODIUM SERPL-SCNC: 138 MMOL/L (ref 136–145)
T4 FREE SERPL-MCNC: 1.02 NG/DL (ref 0.9–1.7)
TRIGL SERPL-MCNC: 125 MG/DL
TSH SERPL DL<=0.005 MIU/L-ACNC: 8.9 UIU/ML (ref 0.3–4.2)
WBC # BLD AUTO: 5.7 10E3/UL (ref 4–11)

## 2023-05-31 PROCEDURE — 84439 ASSAY OF FREE THYROXINE: CPT | Performed by: INTERNAL MEDICINE

## 2023-05-31 PROCEDURE — 90750 HZV VACC RECOMBINANT IM: CPT | Performed by: INTERNAL MEDICINE

## 2023-05-31 PROCEDURE — 36415 COLL VENOUS BLD VENIPUNCTURE: CPT | Performed by: INTERNAL MEDICINE

## 2023-05-31 PROCEDURE — 80053 COMPREHEN METABOLIC PANEL: CPT | Performed by: INTERNAL MEDICINE

## 2023-05-31 PROCEDURE — G0103 PSA SCREENING: HCPCS | Performed by: INTERNAL MEDICINE

## 2023-05-31 PROCEDURE — 71046 X-RAY EXAM CHEST 2 VIEWS: CPT | Mod: TC | Performed by: RADIOLOGY

## 2023-05-31 PROCEDURE — 87389 HIV-1 AG W/HIV-1&-2 AB AG IA: CPT | Performed by: INTERNAL MEDICINE

## 2023-05-31 PROCEDURE — 86803 HEPATITIS C AB TEST: CPT | Performed by: INTERNAL MEDICINE

## 2023-05-31 PROCEDURE — 80061 LIPID PANEL: CPT | Performed by: INTERNAL MEDICINE

## 2023-05-31 PROCEDURE — 99213 OFFICE O/P EST LOW 20 MIN: CPT | Mod: 25 | Performed by: INTERNAL MEDICINE

## 2023-05-31 PROCEDURE — 99396 PREV VISIT EST AGE 40-64: CPT | Mod: 25 | Performed by: INTERNAL MEDICINE

## 2023-05-31 PROCEDURE — 90471 IMMUNIZATION ADMIN: CPT | Performed by: INTERNAL MEDICINE

## 2023-05-31 PROCEDURE — 84443 ASSAY THYROID STIM HORMONE: CPT | Performed by: INTERNAL MEDICINE

## 2023-05-31 PROCEDURE — 85027 COMPLETE CBC AUTOMATED: CPT | Performed by: INTERNAL MEDICINE

## 2023-05-31 ASSESSMENT — ENCOUNTER SYMPTOMS
SHORTNESS OF BREATH: 0
FEVER: 0
HEADACHES: 0
DIARRHEA: 0
DYSURIA: 0
CONSTIPATION: 0
NAUSEA: 0
HEMATOCHEZIA: 0
PALPITATIONS: 0
CHILLS: 0
PARESTHESIAS: 0
EYE PAIN: 0
COUGH: 1
NERVOUS/ANXIOUS: 0
ABDOMINAL PAIN: 0
MYALGIAS: 0
SORE THROAT: 0
FREQUENCY: 0
JOINT SWELLING: 0
DIZZINESS: 0
ARTHRALGIAS: 0
HEMATURIA: 0
HEARTBURN: 0
WEAKNESS: 0

## 2023-05-31 ASSESSMENT — PAIN SCALES - GENERAL: PAINLEVEL: MILD PAIN (3)

## 2023-05-31 NOTE — LETTER
June 2, 2023      Mark Roy  [address] [place] [address]        Dear ,    We are writing to inform you of your test results.    Your chest xray is normal.    Resulted Orders   XR Chest 2 Views    Narrative    CHEST TWO VIEWS  5/31/2023 8:10 AM     HISTORY: Cough, unspecified type. Encounter for preventative adult  health care examination.    COMPARISON: 5/20/2018.      Impression    IMPRESSION: No acute cardiopulmonary disease.    MERCY DAHL MD         SYSTEM ID:  [id number]       If you have any questions or concerns, please call the clinic at the number listed above.       Sincerely,      aKna Tapia MD

## 2023-05-31 NOTE — LETTER
June 2, 2023      Mark Roy  [address] [place] [address]        Dear ,    We are writing to inform you of your test results. Your labs show elevated liver enzymes. I recommend to assess with a liver ultrasound and follow up labs in 3 months. Avoid alcohol consumption.   Your cholesterol and blood sugar are high, keep diet and exercise. Will assess test for diabetes with future lab work.    Resulted Orders   HIV Antigen Antibody Combo   Result Value Ref Range    HIV Antigen Antibody Combo Nonreactive Nonreactive      Comment:      HIV-1 p24 Ag & HIV-1/HIV-2 Ab Not Detected   Hepatitis C Screen Reflex to HCV RNA Quant and Genotype   Result Value Ref Range    Hepatitis C Antibody Nonreactive Nonreactive    Narrative    Assay performance characteristics have not been established for newborns, infants, and children.   Lipid panel reflex to direct LDL Non-fasting   Result Value Ref Range    Cholesterol 226 (H) <200 mg/dL    Triglycerides 125 <150 mg/dL    Direct Measure HDL 53 >=40 mg/dL    LDL Cholesterol Calculated 148 (H) <=100 mg/dL    Non HDL Cholesterol 173 (H) <130 mg/dL    Narrative    Cholesterol  Desirable:  <200 mg/dL    Triglycerides  Normal:  Less than 150 mg/dL  Borderline High:  150-199 mg/dL  High:  200-499 mg/dL  Very High:  Greater than or equal to 500 mg/dL    Direct Measure HDL  Female:  Greater than or equal to 50 mg/dL   Male:  Greater than or equal to 40 mg/dL    LDL Cholesterol  Desirable:  <100mg/dL  Above Desirable:  100-129 mg/dL   Borderline High:  130-159 mg/dL   High:  160-189 mg/dL   Very High:  >= 190 mg/dL    Non HDL Cholesterol  Desirable:  130 mg/dL  Above Desirable:  130-159 mg/dL  Borderline High:  160-189 mg/dL  High:  190-219 mg/dL  Very High:  Greater than or equal to 220 mg/dL   CBC with platelets   Result Value Ref Range    WBC Count 5.7 4.0 - 11.0 10e3/uL    RBC Count 4.77 4.40 - 5.90 10e6/uL    Hemoglobin 15.4 13.3 - 17.7 g/dL    Hematocrit 45.8  40.0 - 53.0 %    MCV 96 78 - 100 fL    MCH 32.3 26.5 - 33.0 pg    MCHC 33.6 31.5 - 36.5 g/dL    RDW 13.1 10.0 - 15.0 %    Platelet Count 177 150 - 450 10e3/uL   Comprehensive metabolic panel (BMP + Alb, Alk Phos, ALT, AST, Total. Bili, TP)   Result Value Ref Range    Sodium 138 136 - 145 mmol/L    Potassium 4.6 3.4 - 5.3 mmol/L    Chloride 101 98 - 107 mmol/L    Carbon Dioxide (CO2) 20 (L) 22 - 29 mmol/L    Anion Gap 17 (H) 7 - 15 mmol/L    Urea Nitrogen 16.3 6.0 - 20.0 mg/dL    Creatinine 0.94 0.67 - 1.17 mg/dL    Calcium 9.7 8.6 - 10.0 mg/dL    Glucose 230 (H) 70 - 99 mg/dL    Alkaline Phosphatase 128 40 - 129 U/L    AST 63 (H) 10 - 50 U/L    ALT 77 (H) 10 - 50 U/L    Protein Total 7.8 6.4 - 8.3 g/dL    Albumin 4.4 3.5 - 5.2 g/dL    Bilirubin Total 0.7 <=1.2 mg/dL    GFR Estimate >90 >60 mL/min/1.73m2      Comment:      eGFR calculated using 2021 CKD-EPI equation.   PSA, screen   Result Value Ref Range    Prostate Specific Antigen Screen 2.34 0.00 - 3.50 ng/mL    Narrative    This result is obtained using the Roche Elecsys total PSA method on the abner e801 immunoassay analyzer. Results obtained with different assay methods or kits cannot be used interchangeably.   TSH with free T4 reflex   Result Value Ref Range    TSH 8.90 (H) 0.30 - 4.20 uIU/mL   T4 free   Result Value Ref Range    Free T4 1.02 0.90 - 1.70 ng/dL       If you have any questions or concerns, please call the clinic at the number listed above.       Sincerely,      Kana Tapia MD

## 2023-05-31 NOTE — NURSING NOTE
Prior to immunization administration, verified patients identity using patient s name and date of birth. Please see Immunization Activity for additional information.     Screening Questionnaire for Adult Immunization    Are you sick today?   No   Do you have allergies to medications, food, a vaccine component or latex?   No   Have you ever had a serious reaction after receiving a vaccination?   No   Do you have a long-term health problem with heart, lung, kidney, or metabolic disease (e.g., diabetes), asthma, a blood disorder, no spleen, complement component deficiency, a cochlear implant, or a spinal fluid leak?  Are you on long-term aspirin therapy?   No   Do you have cancer, leukemia, HIV/AIDS, or any other immune system problem?   No   Do you have a parent, brother, or sister with an immune system problem?   No   In the past 3 months, have you taken medications that affect  your immune system, such as prednisone, other steroids, or anticancer drugs; drugs for the treatment of rheumatoid arthritis, Crohn s disease, or psoriasis; or have you had radiation treatments?   No   Have you had a seizure, or a brain or other nervous system problem?   No   During the past year, have you received a transfusion of blood or blood    products, or been given immune (gamma) globulin or antiviral drug?   No   For women: Are you pregnant or is there a chance you could become       pregnant during the next month?   No   Have you received any vaccinations in the past 4 weeks?   No     Immunization questionnaire answers were all negative.      Injection of Shingrix given by Rima Desir CMA. Patient instructed to remain in clinic for 15 minutes afterwards, and to report any adverse reactions.     Screening performed by Rima Desir CMA on 5/31/2023 at 7:52 AM.

## 2023-05-31 NOTE — PROGRESS NOTES
SUBJECTIVE:   CC: Mark is an 57 year old who presents for preventative health visit.       5/31/2023     7:16 AM   Additional Questions   Roomed by Rima WARD   Accompanied by n/a     Patient has been advised of split billing requirements and indicates understanding: Yes  Healthy Habits:    Getting at least 3 servings of Calcium per day:  Yes    Bi-annual eye exam:  NO    Dental care twice a year:  NO    Sleep apnea or symptoms of sleep apnea:  None    Diet:  Regular (no restrictions)    Frequency of exercise:  2-3 days/week    Duration of exercise:  15-30 minutes    Taking medications regularly:  Yes    Medication side effects:  Not applicable    PHQ-2 Total Score:              PROBLEMS TO ADD ON...  Has h/o HTN. on medical treatment. BP has been controlled. No side effects from medications. No CP, HA, dizziness. good compliance with medications and low salt diet.  Reports symptoms of cough , started after his rotator cuff surgery 3 month ago, on and off, improved. Now recurrent after inhaling surface cleaning spray. Dry cough with scarce phlegm. No SOB, fever, CP, wheezing. On Lisinopril for HTN, no prior h/o cough. Has had COVID over a year ago. Was coughing during it, but resolved after a month.     Have you ever done Advance Care Planning? (For example, a Health Directive, POLST, or a discussion with a medical provider or your loved ones about your wishes): No, advance care planning information given to patient to review.  Patient declined advance care planning discussion at this time.    Social History     Tobacco Use     Smoking status: Never     Smokeless tobacco: Never   Vaping Use     Vaping status: Never Used   Substance Use Topics     Alcohol use: Yes     Comment: socially             5/27/2023     9:41 PM   Alcohol Use   Prescreen: >3 drinks/day or >7 drinks/week? No          View : No data to display.                Last PSA:   PSA   Date Value Ref Range Status   07/08/2016 0.97 0 - 4 ug/L Final  "      Reviewed orders with patient. Reviewed health maintenance and updated orders accordingly - Yes  Lab work is in process  Labs reviewed in EPIC    Reviewed and updated as needed this visit by clinical staff   Tobacco  Allergies  Meds  Problems  Med Hx  Surg Hx  Fam Hx          Reviewed and updated as needed this visit by Provider                     Review of Systems   Constitutional: Negative for chills and fever.   HENT: Positive for congestion. Negative for ear pain, hearing loss and sore throat.    Eyes: Negative for pain and visual disturbance.   Respiratory: Positive for cough. Negative for shortness of breath.    Cardiovascular: Negative for chest pain, palpitations and peripheral edema.   Gastrointestinal: Negative for abdominal pain, constipation, diarrhea, heartburn, hematochezia and nausea.   Genitourinary: Negative for dysuria, frequency, genital sores, hematuria, impotence, penile discharge and urgency.   Musculoskeletal: Negative for arthralgias, joint swelling and myalgias.   Skin: Negative for rash.   Neurological: Negative for dizziness, weakness, headaches and paresthesias.   Psychiatric/Behavioral: Negative for mood changes. The patient is not nervous/anxious.          OBJECTIVE:   BP (!) 150/96 (BP Location: Left arm, Cuff Size: Adult Regular)   Pulse 100   Temp 97.7  F (36.5  C) (Tympanic)   Resp 14   Ht 1.93 m (6' 4\")   Wt 104.7 kg (230 lb 12.8 oz)   SpO2 95%   BMI 28.09 kg/m      Physical Exam  GENERAL: healthy, alert and no distress  EYES: Eyes grossly normal to inspection, PERRL and conjunctivae and sclerae normal  HENT: ear canals and TM's normal, nose and mouth without ulcers or lesions  NECK: no adenopathy, no asymmetry, masses, or scars and thyroid normal to palpation  RESP: lungs clear to auscultation - no rales, rhonchi or wheezes  CV: regular rate and rhythm, normal S1 S2, no S3 or S4, no murmur, click or rub, no peripheral edema and peripheral pulses " strong  ABDOMEN: soft, nontender, no hepatosplenomegaly, no masses and bowel sounds normal  MS: no gross musculoskeletal defects noted, no edema  SKIN: no suspicious lesions or rashes  NEURO: Normal strength and tone, mentation intact and speech normal  PSYCH: mentation appears normal, affect normal/bright    Diagnostic Test Results:  Labs reviewed in Epic    ASSESSMENT/PLAN:       ICD-10-CM    1. Encounter for preventative adult health care examination  Z00.00 Lipid panel reflex to direct LDL Non-fasting     CBC with platelets     Comprehensive metabolic panel (BMP + Alb, Alk Phos, ALT, AST, Total. Bili, TP)     PSA, screen     TSH with free T4 reflex     XR Chest 2 Views      2. Screening for HIV (human immunodeficiency virus)  Z11.4 HIV Antigen Antibody Combo      3. Need for hepatitis C screening test  Z11.59 Hepatitis C Screen Reflex to HCV RNA Quant and Genotype      4. Cough, unspecified type  R05.9 XR Chest 2 Views     OFFICE/OUTPT VISIT,EST,LEVL III      5. Benign essential hypertension  I10 Lipid panel reflex to direct LDL Non-fasting     CBC with platelets     Comprehensive metabolic panel (BMP + Alb, Alk Phos, ALT, AST, Total. Bili, TP)     TSH with free T4 reflex     OFFICE/OUTPT VISIT,EST,LEVL III      6. Screening for prostate cancer  Z12.5 PSA, screen        BP is elevated here, but usually controlled at home. Continue treatment, monitor, keep low salt diet.   Assess CXR for cough, consider changing Lisinopril for possible cough related to it     Patient has been advised of split billing requirements and indicates understanding: Yes      COUNSELING:   Reviewed preventive health counseling, as reflected in patient instructions       Regular exercise       Healthy diet/nutrition       Vision screening       Hearing screening       Colorectal cancer screening       Prostate cancer screening      BMI:   Estimated body mass index is 28.09 kg/m  as calculated from the following:    Height as of this  "encounter: 1.93 m (6' 4\").    Weight as of this encounter: 104.7 kg (230 lb 12.8 oz).   Weight management plan: Discussed healthy diet and exercise guidelines      He reports that he has never smoked. He has never used smokeless tobacco.            Kana Tapia MD  [facility]  "

## 2024-04-24 DIAGNOSIS — I10 BENIGN ESSENTIAL HYPERTENSION: ICD-10-CM

## 2024-04-24 RX ORDER — LISINOPRIL 10 MG/1
10 TABLET ORAL DAILY
Qty: 90 TABLET | Refills: 3 | Status: SHIPPED | OUTPATIENT
Start: 2024-04-24

## 2024-05-29 ENCOUNTER — PATIENT OUTREACH (OUTPATIENT)
Dept: CARE COORDINATION | Facility: CLINIC | Age: 59
End: 2024-05-29
Payer: COMMERCIAL

## 2024-08-04 ENCOUNTER — HEALTH MAINTENANCE LETTER (OUTPATIENT)
Age: 59
End: 2024-08-04

## 2024-08-12 SDOH — HEALTH STABILITY: PHYSICAL HEALTH: ON AVERAGE, HOW MANY DAYS PER WEEK DO YOU ENGAGE IN MODERATE TO STRENUOUS EXERCISE (LIKE A BRISK WALK)?: 3 DAYS

## 2024-08-12 SDOH — HEALTH STABILITY: PHYSICAL HEALTH: ON AVERAGE, HOW MANY MINUTES DO YOU ENGAGE IN EXERCISE AT THIS LEVEL?: 30 MIN

## 2024-08-12 ASSESSMENT — SOCIAL DETERMINANTS OF HEALTH (SDOH): HOW OFTEN DO YOU GET TOGETHER WITH FRIENDS OR RELATIVES?: ONCE A WEEK

## 2024-08-16 ENCOUNTER — OFFICE VISIT (OUTPATIENT)
Dept: INTERNAL MEDICINE | Facility: CLINIC | Age: 59
End: 2024-08-16
Payer: COMMERCIAL

## 2024-08-16 VITALS
TEMPERATURE: 97.5 F | HEIGHT: 76 IN | OXYGEN SATURATION: 100 % | HEART RATE: 89 BPM | DIASTOLIC BLOOD PRESSURE: 86 MMHG | WEIGHT: 219.8 LBS | SYSTOLIC BLOOD PRESSURE: 150 MMHG | RESPIRATION RATE: 18 BRPM | BODY MASS INDEX: 26.77 KG/M2

## 2024-08-16 DIAGNOSIS — Z12.5 SCREENING FOR PROSTATE CANCER: ICD-10-CM

## 2024-08-16 DIAGNOSIS — R73.9 HYPERGLYCEMIA: ICD-10-CM

## 2024-08-16 DIAGNOSIS — I10 BENIGN ESSENTIAL HYPERTENSION: ICD-10-CM

## 2024-08-16 DIAGNOSIS — Z00.00 ENCOUNTER FOR PREVENTATIVE ADULT HEALTH CARE EXAMINATION: Primary | ICD-10-CM

## 2024-08-16 DIAGNOSIS — R53.83 OTHER FATIGUE: ICD-10-CM

## 2024-08-16 LAB
ALBUMIN SERPL BCG-MCNC: 4.1 G/DL (ref 3.5–5.2)
ALBUMIN UR-MCNC: NEGATIVE MG/DL
ALP SERPL-CCNC: 100 U/L (ref 40–150)
ALT SERPL W P-5'-P-CCNC: 44 U/L (ref 0–70)
ANION GAP SERPL CALCULATED.3IONS-SCNC: 12 MMOL/L (ref 7–15)
APPEARANCE UR: CLEAR
AST SERPL W P-5'-P-CCNC: 46 U/L (ref 0–45)
BACTERIA #/AREA URNS HPF: ABNORMAL /HPF
BILIRUB SERPL-MCNC: 1 MG/DL
BILIRUB UR QL STRIP: NEGATIVE
BUN SERPL-MCNC: 15.8 MG/DL (ref 6–20)
CALCIUM SERPL-MCNC: 9.6 MG/DL (ref 8.8–10.4)
CHLORIDE SERPL-SCNC: 101 MMOL/L (ref 98–107)
CHOLEST SERPL-MCNC: 164 MG/DL
COLOR UR AUTO: YELLOW
CREAT SERPL-MCNC: 1 MG/DL (ref 0.67–1.17)
EGFRCR SERPLBLD CKD-EPI 2021: 87 ML/MIN/1.73M2
ERYTHROCYTE [DISTWIDTH] IN BLOOD BY AUTOMATED COUNT: 13.3 % (ref 10–15)
FASTING STATUS PATIENT QL REPORTED: YES
FASTING STATUS PATIENT QL REPORTED: YES
GLUCOSE SERPL-MCNC: 195 MG/DL (ref 70–99)
GLUCOSE UR STRIP-MCNC: NEGATIVE MG/DL
HCO3 SERPL-SCNC: 23 MMOL/L (ref 22–29)
HCT VFR BLD AUTO: 40.8 % (ref 40–53)
HDLC SERPL-MCNC: 55 MG/DL
HGB BLD-MCNC: 13.9 G/DL (ref 13.3–17.7)
HGB UR QL STRIP: NEGATIVE
KETONES UR STRIP-MCNC: NEGATIVE MG/DL
LDLC SERPL CALC-MCNC: 96 MG/DL
LEUKOCYTE ESTERASE UR QL STRIP: NEGATIVE
MCH RBC QN AUTO: 32.3 PG (ref 26.5–33)
MCHC RBC AUTO-ENTMCNC: 34.1 G/DL (ref 31.5–36.5)
MCV RBC AUTO: 95 FL (ref 78–100)
MUCOUS THREADS #/AREA URNS LPF: PRESENT /LPF
NITRATE UR QL: NEGATIVE
NONHDLC SERPL-MCNC: 109 MG/DL
PH UR STRIP: 7 [PH] (ref 5–7)
PLATELET # BLD AUTO: 195 10E3/UL (ref 150–450)
POTASSIUM SERPL-SCNC: 4.2 MMOL/L (ref 3.4–5.3)
PROT SERPL-MCNC: 7 G/DL (ref 6.4–8.3)
PSA SERPL DL<=0.01 NG/ML-MCNC: 3.27 NG/ML (ref 0–3.5)
RBC # BLD AUTO: 4.31 10E6/UL (ref 4.4–5.9)
RBC #/AREA URNS AUTO: ABNORMAL /HPF
SODIUM SERPL-SCNC: 136 MMOL/L (ref 135–145)
SP GR UR STRIP: 1.02 (ref 1–1.03)
SQUAMOUS #/AREA URNS AUTO: ABNORMAL /LPF
T4 FREE SERPL-MCNC: 1.1 NG/DL (ref 0.9–1.7)
TRIGL SERPL-MCNC: 67 MG/DL
TSH SERPL DL<=0.005 MIU/L-ACNC: 8.76 UIU/ML (ref 0.3–4.2)
UROBILINOGEN UR STRIP-ACNC: 0.2 E.U./DL
WBC # BLD AUTO: 6.5 10E3/UL (ref 4–11)
WBC #/AREA URNS AUTO: ABNORMAL /HPF

## 2024-08-16 PROCEDURE — 81001 URINALYSIS AUTO W/SCOPE: CPT | Performed by: INTERNAL MEDICINE

## 2024-08-16 PROCEDURE — 84443 ASSAY THYROID STIM HORMONE: CPT | Performed by: INTERNAL MEDICINE

## 2024-08-16 PROCEDURE — G0103 PSA SCREENING: HCPCS | Performed by: INTERNAL MEDICINE

## 2024-08-16 PROCEDURE — 99212 OFFICE O/P EST SF 10 MIN: CPT | Mod: 25 | Performed by: INTERNAL MEDICINE

## 2024-08-16 PROCEDURE — 36415 COLL VENOUS BLD VENIPUNCTURE: CPT | Performed by: INTERNAL MEDICINE

## 2024-08-16 PROCEDURE — 84403 ASSAY OF TOTAL TESTOSTERONE: CPT | Performed by: INTERNAL MEDICINE

## 2024-08-16 PROCEDURE — 99396 PREV VISIT EST AGE 40-64: CPT | Performed by: INTERNAL MEDICINE

## 2024-08-16 PROCEDURE — 84439 ASSAY OF FREE THYROXINE: CPT | Performed by: INTERNAL MEDICINE

## 2024-08-16 PROCEDURE — 80053 COMPREHEN METABOLIC PANEL: CPT | Performed by: INTERNAL MEDICINE

## 2024-08-16 PROCEDURE — 80061 LIPID PANEL: CPT | Performed by: INTERNAL MEDICINE

## 2024-08-16 PROCEDURE — 85027 COMPLETE CBC AUTOMATED: CPT | Performed by: INTERNAL MEDICINE

## 2024-08-16 ASSESSMENT — PAIN SCALES - GENERAL: PAINLEVEL: NO PAIN (0)

## 2024-08-16 NOTE — LETTER
August 21, 2024      Luis Roy  [address] [place] [address]        Dear ,    We are writing to inform you of your test results.    Elevated glucose. Recommend to assess HgbA1C, will order lab.   Elevated TSH, normal FT4, follow up lab in 6 months.   The rest of his results are normal.     Resulted Orders   Lipid panel reflex to direct LDL Fasting   Result Value Ref Range    Cholesterol 164 <200 mg/dL    Triglycerides 67 <150 mg/dL    Direct Measure HDL 55 >=40 mg/dL    LDL Cholesterol Calculated 96 <=100 mg/dL    Non HDL Cholesterol 109 <130 mg/dL    Patient Fasting > 8hrs? Yes     Narrative    Cholesterol  Desirable:  <200 mg/dL    Triglycerides  Normal:  Less than 150 mg/dL  Borderline High:  150-199 mg/dL  High:  200-499 mg/dL  Very High:  Greater than or equal to 500 mg/dL    Direct Measure HDL  Female:  Greater than or equal to 50 mg/dL   Male:  Greater than or equal to 40 mg/dL    LDL Cholesterol  Desirable:  <100mg/dL  Above Desirable:  100-129 mg/dL   Borderline High:  130-159 mg/dL   High:  160-189 mg/dL   Very High:  >= 190 mg/dL    Non HDL Cholesterol  Desirable:  130 mg/dL  Above Desirable:  130-159 mg/dL  Borderline High:  160-189 mg/dL  High:  190-219 mg/dL  Very High:  Greater than or equal to 220 mg/dL   Testosterone, total   Result Value Ref Range    Testosterone Total 649 240 - 950 ng/dL   CBC with platelets   Result Value Ref Range    WBC Count 6.5 4.0 - 11.0 10e3/uL    RBC Count 4.31 (L) 4.40 - 5.90 10e6/uL    Hemoglobin 13.9 13.3 - 17.7 g/dL    Hematocrit 40.8 40.0 - 53.0 %    MCV 95 78 - 100 fL    MCH 32.3 26.5 - 33.0 pg    MCHC 34.1 31.5 - 36.5 g/dL    RDW 13.3 10.0 - 15.0 %    Platelet Count 195 150 - 450 10e3/uL   Comprehensive metabolic panel (BMP + Alb, Alk Phos, ALT, AST, Total. Bili, TP)   Result Value Ref Range    Sodium 136 135 - 145 mmol/L    Potassium 4.2 3.4 - 5.3 mmol/L    Carbon Dioxide (CO2) 23 22 - 29 mmol/L    Anion Gap 12 7 - 15 mmol/L    Urea  Nitrogen 15.8 6.0 - 20.0 mg/dL    Creatinine 1.00 0.67 - 1.17 mg/dL    GFR Estimate 87 >60 mL/min/1.73m2      Comment:      eGFR calculated using 2021 CKD-EPI equation.    Calcium 9.6 8.8 - 10.4 mg/dL      Comment:      Reference intervals for this test were updated on 7/16/2024 to reflect our healthy population more accurately. There may be differences in the flagging of prior results with similar values performed with this method. Those prior results can be interpreted in the context of the updated reference intervals.    Chloride 101 98 - 107 mmol/L    Glucose 195 (H) 70 - 99 mg/dL    Alkaline Phosphatase 100 40 - 150 U/L    AST 46 (H) 0 - 45 U/L    ALT 44 0 - 70 U/L    Protein Total 7.0 6.4 - 8.3 g/dL    Albumin 4.1 3.5 - 5.2 g/dL    Bilirubin Total 1.0 <=1.2 mg/dL    Patient Fasting > 8hrs? Yes    TSH with free T4 reflex   Result Value Ref Range    TSH 8.76 (H) 0.30 - 4.20 uIU/mL   PSA, screen   Result Value Ref Range    Prostate Specific Antigen Screen 3.27 0.00 - 3.50 ng/mL    Narrative    This result is obtained using the Roche Elecsys total PSA method on the abner e801 immunoassay analyzer. Results obtained with different assay methods or kits cannot be used interchangeably.   UA with Microscopic reflex to Culture - lab collect   Result Value Ref Range    Color Urine Yellow Colorless, Straw, Light Yellow, Yellow    Appearance Urine Clear Clear    Glucose Urine Negative Negative mg/dL    Bilirubin Urine Negative Negative    Ketones Urine Negative Negative mg/dL    Specific Gravity Urine 1.020 1.003 - 1.035    Blood Urine Negative Negative    pH Urine 7.0 5.0 - 7.0    Protein Albumin Urine Negative Negative mg/dL    Urobilinogen Urine 0.2 0.2, 1.0 E.U./dL    Nitrite Urine Negative Negative    Leukocyte Esterase Urine Negative Negative   UA Microscopic with Reflex to Culture   Result Value Ref Range    Bacteria Urine Moderate (A) None Seen /HPF    RBC Urine 0-2 0-2 /HPF /HPF    WBC Urine 0-5 0-5 /HPF /HPF     Squamous Epithelials Urine Moderate (A) None Seen /LPF    Mucus Urine Present (A) None Seen /LPF    Narrative    Urine Culture not indicated   T4 free   Result Value Ref Range    Free T4 1.10 0.90 - 1.70 ng/dL       If you have any questions or concerns, please call the clinic at the number listed above.       Sincerely,      Kana Tapia MD

## 2024-08-16 NOTE — PROGRESS NOTES
"Preventive Care Visit  [facility]  Kana Tapia MD, Internal Medicine  Aug 16, 2024      Assessment & Plan     Benign essential hypertension  Continue treatment, monitor BP, usually is controlled at home , work, with blood donations.   Keep low salt diet   - Lipid panel reflex to direct LDL Fasting  - Testosterone, total  - CBC with platelets  - Comprehensive metabolic panel (BMP + Alb, Alk Phos, ALT, AST, Total. Bili, TP)  - TSH with free T4 reflex  - UA with Microscopic reflex to Culture - lab collect  - OFFICE/OUTPT VISIT,EST,LEVL III    Encounter for preventative adult health care examination  advised regular aerobic activity, low cholesterol, low salt diet, wearing seat belt,  self examinations, sunscreen protection.Obtain screening cholesterol, immunizations reviewed.    - Lipid panel reflex to direct LDL Fasting  - Testosterone, total  - CBC with platelets  - Comprehensive metabolic panel (BMP + Alb, Alk Phos, ALT, AST, Total. Bili, TP)  - TSH with free T4 reflex  - PSA, screen  - UA with Microscopic reflex to Culture - lab collect    Screening for prostate cancer    - PSA, screen    Other fatigue  Assess T level, mild ED also reported   - Testosterone, total  - OFFICE/OUTPT VISIT,EST,LEVL III    Patient has been advised of split billing requirements and indicates understanding: Yes        BMI  Estimated body mass index is 26.77 kg/m  as calculated from the following:    Height as of this encounter: 1.93 m (6' 3.98\").    Weight as of this encounter: 99.7 kg (219 lb 12.8 oz).   Weight management plan: Discussed healthy diet and exercise guidelines    Counseling  Appropriate preventive services were addressed with this patient via screening, questionnaire, or discussion as appropriate for fall prevention, nutrition, physical activity, Tobacco-use cessation, social engagement, weight loss and cognition.  Checklist reviewing preventive services available has been given to the " patient.  Reviewed patient's diet, addressing concerns and/or questions.   He is at risk for lack of exercise and has been provided with information to increase physical activity for the benefit of his well-being.   The patient was instructed to see the dentist every 6 months.   He is at risk for psychosocial distress and has been provided with information to reduce risk.   The patient reports drinking more than 3 alcoholic drinks per day and/or more than 7 drhnks per week. The patient was counseled and given information about possible harmful effects of excessive alcohol intake.    See Patient Instructions    Subjective   Luis is a 59 year old, presenting for the following:  Physical        8/16/2024     6:57 AM   Additional Questions   Roomed by Linda Alvarez MA   Accompanied by self         8/16/2024     6:57 AM   Patient Reported Additional Medications   Patient reports taking the following new medications None        HPI      Has h/o HTN. on medical treatment. BP has been controlled. No side effects from medications. No CP, HA, dizziness. good compliance with medications and low salt diet.  Has lost weight with diet and exercise. Reports feeling tired, low energy, mild ED symptoms.               8/12/2024   General Health   How would you rate your overall physical health? Good   Feel stress (tense, anxious, or unable to sleep) Only a little      (!) STRESS CONCERN      8/12/2024   Nutrition   Three or more servings of calcium each day? Yes   Diet: Regular (no restrictions)   How many servings of fruit and vegetables per day? (!) 2-3   How many sweetened beverages each day? 0-1            8/12/2024   Exercise   Days per week of moderate/strenous exercise 3 days   Average minutes spent exercising at this level 30 min            8/12/2024   Social Factors   Frequency of gathering with friends or relatives Once a week   Worry food won't last until get money to buy more No   Food not last or not have enough money for  food? No   Do you have housing? (Housing is defined as stable permanent housing and does not include staying ouside in a car, in a tent, in an abandoned building, in an overnight shelter, or couch-surfing.) Yes   Are you worried about losing your housing? No   Lack of transportation? No   Unable to get utilities (heat,electricity)? No            8/12/2024   Fall Risk   Fallen 2 or more times in the past year? No   Trouble with walking or balance? No             8/12/2024   Dental   Dentist two times every year? (!) NO            5/27/2024   TB Screening   Were you born outside of the [place]? No            Today's PHQ-2 Score:       8/15/2024     5:36 PM   PHQ-2 ( 1999 [employer])   Q1: Little interest or pleasure in doing things 0   Q2: Feeling down, depressed or hopeless 0   PHQ-2 Score 0   Q1: Little interest or pleasure in doing things Not at all   Q2: Feeling down, depressed or hopeless Not at all   PHQ-2 Score 0           8/12/2024   Substance Use   Alcohol more than 3/day or more than 7/wk Yes   How often do you have a drink containing alcohol 2 to 3 times a week   How many alcohol drinks on typical day 5 or 6   How often do you have 5+ drinks at one occasion Weekly   Audit 2/3 Score 5   How often not able to stop drinking once started Never   How often failed to do what normally expected Never   How often needed first drink in am after a heavy drinking session Never   How often feeling of guilt or remorse after drinking Never   How often unable to remember what happened the night before Never   Have you or someone else been injured because of your drinking No   Has anyone been concerned or suggested you cut down on drinking No   TOTAL SCORE - AUDIT 8   Do you use any other substances recreationally? No        Social History     Tobacco Use    Smoking status: Never    Smokeless tobacco: Never   Vaping Use    Vaping status: Never Used   Substance Use Topics    Alcohol use: Yes     Comment: socially    Drug use: No  "          8/12/2024   STI Screening   New sexual partner(s) since last STI/HIV test? (!) YES       Last PSA:   PSA   Date Value Ref Range Status   07/08/2016 0.97 0 - 4 ug/L Final     Prostate Specific Antigen Screen   Date Value Ref Range Status   05/31/2023 2.34 0.00 - 3.50 ng/mL Final     ASCVD Risk   The 10-year ASCVD risk score (Giselle JUNIOR, et al., 2019) is: 12.7%    Values used to calculate the score:      Age: 59 years      Sex: Male      Is Non- : No      Diabetic: No      Tobacco smoker: No      Systolic Blood Pressure: 155 mmHg      Is BP treated: Yes      HDL Cholesterol: 53 mg/dL      Total Cholesterol: 226 mg/dL           Reviewed and updated as needed this visit by Provider                    Lab work is in process  Labs reviewed in EPIC      Review of Systems  CONSTITUTIONAL: NEGATIVE for fever, chills, change in weight  INTEGUMENTARY/SKIN: NEGATIVE for worrisome rashes, moles or lesions  EYES: NEGATIVE for vision changes or irritation  ENT/MOUTH: NEGATIVE for ear, mouth and throat problems  RESP: NEGATIVE for significant cough or SOB  BREAST: NEGATIVE for masses, tenderness or discharge  CV: NEGATIVE for chest pain, palpitations or peripheral edema  GI: NEGATIVE for nausea, abdominal pain, heartburn, or change in bowel habits  : NEGATIVE for frequency, dysuria, or hematuria  MUSCULOSKELETAL: NEGATIVE for significant arthralgias or myalgia  NEURO: NEGATIVE for weakness, dizziness or paresthesias  ENDOCRINE: NEGATIVE for temperature intolerance, skin/hair changes  HEME: NEGATIVE for bleeding problems  PSYCHIATRIC: NEGATIVE for changes in mood or affect     Objective    Exam  BP (!) 155/90 (BP Location: Right arm, Patient Position: Sitting, Cuff Size: Adult Regular)   Pulse 89   Temp 97.5  F (36.4  C) (Oral)   Resp 18   Ht 1.93 m (6' 3.98\")   Wt 99.7 kg (219 lb 12.8 oz)   SpO2 100%   BMI 26.77 kg/m     Estimated body mass index is 26.77 kg/m  as calculated from " "the following:    Height as of this encounter: 1.93 m (6' 3.98\").    Weight as of this encounter: 99.7 kg (219 lb 12.8 oz).    Physical Exam  GENERAL: alert and no distress, overweight  EYES: Eyes grossly normal to inspection, PERRL and conjunctivae and sclerae normal  HENT: ear canals and TM's normal, nose and mouth without ulcers or lesions  NECK: no adenopathy, no asymmetry, masses, or scars  RESP: lungs clear to auscultation - no rales, rhonchi or wheezes  CV: regular rate and rhythm, normal S1 S2, no S3 or S4, no murmur, click or rub, no peripheral edema  ABDOMEN: soft, nontender, no hepatosplenomegaly, no masses and bowel sounds normal  MS: no gross musculoskeletal defects noted, no edema  SKIN: no suspicious lesions or rashes  NEURO: Normal strength and tone, mentation intact and speech normal  PSYCH: mentation appears normal, affect normal/bright        Signed Electronically by: Kana Tapia MD    "

## 2024-08-20 LAB — TESTOST SERPL-MCNC: 649 NG/DL (ref 240–950)

## 2024-10-15 ENCOUNTER — OFFICE VISIT (OUTPATIENT)
Dept: INTERNAL MEDICINE | Facility: CLINIC | Age: 59
End: 2024-10-15
Payer: COMMERCIAL

## 2024-10-15 VITALS
RESPIRATION RATE: 17 BRPM | SYSTOLIC BLOOD PRESSURE: 132 MMHG | DIASTOLIC BLOOD PRESSURE: 80 MMHG | WEIGHT: 219.4 LBS | BODY MASS INDEX: 26.72 KG/M2 | TEMPERATURE: 97.8 F | OXYGEN SATURATION: 99 % | HEART RATE: 71 BPM

## 2024-10-15 DIAGNOSIS — R73.9 HYPERGLYCEMIA: ICD-10-CM

## 2024-10-15 DIAGNOSIS — Z01.818 PREOP GENERAL PHYSICAL EXAM: Primary | ICD-10-CM

## 2024-10-15 DIAGNOSIS — M67.959 TENDINOPATHY OF HIP: ICD-10-CM

## 2024-10-15 DIAGNOSIS — M25.552 HIP PAIN, LEFT: ICD-10-CM

## 2024-10-15 DIAGNOSIS — E11.9 TYPE 2 DIABETES MELLITUS WITHOUT COMPLICATION, WITHOUT LONG-TERM CURRENT USE OF INSULIN (H): ICD-10-CM

## 2024-10-15 DIAGNOSIS — I10 BENIGN ESSENTIAL HYPERTENSION: ICD-10-CM

## 2024-10-15 LAB
ANION GAP SERPL CALCULATED.3IONS-SCNC: 11 MMOL/L (ref 7–15)
BUN SERPL-MCNC: 12 MG/DL (ref 6–20)
CALCIUM SERPL-MCNC: 10.3 MG/DL (ref 8.8–10.4)
CHLORIDE SERPL-SCNC: 97 MMOL/L (ref 98–107)
CREAT SERPL-MCNC: 0.94 MG/DL (ref 0.67–1.17)
EGFRCR SERPLBLD CKD-EPI 2021: >90 ML/MIN/1.73M2
EST. AVERAGE GLUCOSE BLD GHB EST-MCNC: 192 MG/DL
GLUCOSE SERPL-MCNC: 197 MG/DL (ref 70–99)
HBA1C MFR BLD: 8.3 % (ref 0–5.6)
HCO3 SERPL-SCNC: 26 MMOL/L (ref 22–29)
POTASSIUM SERPL-SCNC: 5 MMOL/L (ref 3.4–5.3)
SODIUM SERPL-SCNC: 134 MMOL/L (ref 135–145)

## 2024-10-15 PROCEDURE — 36415 COLL VENOUS BLD VENIPUNCTURE: CPT | Performed by: PHYSICIAN ASSISTANT

## 2024-10-15 PROCEDURE — 99214 OFFICE O/P EST MOD 30 MIN: CPT | Performed by: PHYSICIAN ASSISTANT

## 2024-10-15 PROCEDURE — 80048 BASIC METABOLIC PNL TOTAL CA: CPT | Performed by: PHYSICIAN ASSISTANT

## 2024-10-15 PROCEDURE — 83036 HEMOGLOBIN GLYCOSYLATED A1C: CPT | Performed by: PHYSICIAN ASSISTANT

## 2024-10-15 NOTE — PROGRESS NOTES
Preoperative Evaluation  [address] [address]  Phone: [phone]  Primary Provider: Kana Tapia MD  Pre-op Performing Provider: Lisette Landry PA-C  Oct 15, 2024             10/14/2024   Surgical Information   What procedure is being done? Left hip surgery...hip rotator cuff reattachment   Facility or Hospital where procedure/surgery will be performed: [facility]   Who is doing the procedure / surgery? Dr. Jason Brothers   Date of surgery / procedure: 11-12-24   Time of surgery / procedure: Tuesday afternoon   Where do you plan to recover after surgery? at home with family        Fax number for surgical facility: [phone]    Assessment & Plan     The proposed surgical procedure is considered INTERMEDIATE risk.    Preop general physical exam    - Basic metabolic panel  (Ca, Cl, CO2, Creat, Gluc, K, Na, BUN); Future  - Basic metabolic panel  (Ca, Cl, CO2, Creat, Gluc, K, Na, BUN)    Hip pain, left    - Basic metabolic panel  (Ca, Cl, CO2, Creat, Gluc, K, Na, BUN); Future  - Basic metabolic panel  (Ca, Cl, CO2, Creat, Gluc, K, Na, BUN)    Tendinopathy of hip    - Basic metabolic panel  (Ca, Cl, CO2, Creat, Gluc, K, Na, BUN); Future  - Basic metabolic panel  (Ca, Cl, CO2, Creat, Gluc, K, Na, BUN)    Benign essential hypertension    - Basic metabolic panel  (Ca, Cl, CO2, Creat, Gluc, K, Na, BUN); Future  - Basic metabolic panel  (Ca, Cl, CO2, Creat, Gluc, K, Na, BUN)    Hyperglycemia    - Hemoglobin A1c           Risks and Recommendations  The patient has the following additional risks and recommendations for perioperative complications:  Diabetes: new diagnosis based on A1C  - Patient is not on insulin therapy: regular NPO guidelines can be followed.     Preoperative Medication Instructions  Antiplatelet or Anticoagulation Medication Instructions   - Patient is on no antiplatelet or anticoagulation medications.    Additional  Medication Instructions  Take all scheduled medications on the day of surgery EXCEPT for modifications listed below:   - do not take Lisinopril the morning of surgery    Recommendation  Approval given to proceed with proposed procedure, without further diagnostic evaluation.  Patient referred to DM education.  A1C results sent to PCP to address.       Jordin Smith is a 59 year old, presenting for the following:  Pre-Op Exam        HPI related to upcoming procedure: Left hip surgery...hip rotator cuff reattachment        10/14/2024   Pre-Op Questionnaire   Have you ever had a heart attack or stroke? No   Have you ever had surgery on your heart or blood vessels, such as a stent placement, a coronary artery bypass, or surgery on an artery in your head, neck, heart, or legs? No   Do you have chest pain with activity? No   Do you have a history of heart failure? No   Do you currently have a cold, bronchitis or symptoms of other infection? No   Do you have a cough, shortness of breath, or wheezing? No   Do you or anyone in your family have previous history of blood clots? No   Do you or does anyone in your family have a serious bleeding problem such as prolonged bleeding following surgeries or cuts? No   Have you ever had problems with anemia or been told to take iron pills? No   Have you had any abnormal blood loss such as black, tarry or bloody stools? No   Have you ever had a blood transfusion? No   Are you willing to have a blood transfusion if it is medically needed before, during, or after your surgery? Yes   Have you or any of your relatives ever had problems with anesthesia? No   Do you have sleep apnea, excessive snoring or daytime drowsiness? No   Do you have any artifical heart valves or other implanted medical devices like a pacemaker, defibrillator, or continuous glucose monitor? No   Do you have artificial joints? No   Are you allergic to latex? No        Health Care Directive  Patient does not have a  Health Care Directive or Living Will:     Preoperative Review of    reviewed - no record of controlled substances prescribed.      Status of Chronic Conditions:  See problem list for active medical problems.  Problems all longstanding and stable, except as noted/documented.  See ROS for pertinent symptoms related to these conditions.    HYPERTENSION - Patient has longstanding history of HTN , currently denies any symptoms referable to elevated blood pressure. Specifically denies chest pain, palpitations, dyspnea, orthopnea, PND or peripheral edema. Blood pressure readings have been in normal range. Current medication regimen is as listed below. Patient denies any side effects of medication.     Patient Active Problem List    Diagnosis Date Noted    Benign essential hypertension 02/07/2023     Priority: Medium    CARDIOVASCULAR SCREENING; LDL GOAL LESS THAN 160 11/30/2012     Priority: Medium      Past Medical History:   Diagnosis Date    Hypertension 1/2023    Right shoulder pain     rotator cuff surgery     Past Surgical History:   Procedure Laterality Date    COLONOSCOPY N/A 08/19/2016    Procedure: COLONOSCOPY;  Surgeon: Elena Geiger MD;  Location: [facility]    HERNIA REPAIR      left inguinal    ROTATOR CUFF REPAIR RT/LT Right 05/01/2016    ROTATOR CUFF REPAIR RT/LT Right     2012and 2015     Current Outpatient Medications   Medication Sig Dispense Refill    lisinopril (ZESTRIL) 10 MG tablet Take 1 tablet (10 mg) by mouth daily 90 tablet 3    NO ACTIVE MEDICATIONS  (Patient not taking: Reported on 8/16/2024)         No Known Allergies     Social History     Tobacco Use    Smoking status: Never    Smokeless tobacco: Never   Substance Use Topics    Alcohol use: Yes     Comment: socially     History   Drug Use No             Review of Systems  Constitutional, HEENT, cardiovascular, pulmonary, gi and gu systems are negative, except as otherwise noted.    Objective    /80 (BP Location: Left arm,  "Patient Position: Sitting, Cuff Size: Adult Regular)   Pulse 71   Temp 97.8  F (36.6  C) (Temporal)   Resp 17   Wt 99.5 kg (219 lb 6.4 oz)   SpO2 99%   BMI 26.72 kg/m     Estimated body mass index is 26.72 kg/m  as calculated from the following:    Height as of 8/16/24: 1.93 m (6' 3.98\").    Weight as of this encounter: 99.5 kg (219 lb 6.4 oz).  Physical Exam  GENERAL: alert and no distress  HENT: normal cephalic/atraumatic, ear canals and TM's normal, and oropharynx clear  NECK: no adenopathy, no asymmetry, masses, or scars  RESP: lungs clear to auscultation - no rales, rhonchi or wheezes  CV: regular rates and rhythm and normal S1 S2, no S3 or S4  ABDOMEN: soft, nontender, no hepatosplenomegaly, no masses and bowel sounds normal  MS: no gross musculoskeletal defects noted, no edema    Recent Labs   Lab Test 08/16/24  0732   HGB 13.9         POTASSIUM 4.2   CR 1.00        Diagnostics  Recent Results (from the past 168 hour(s))   Hemoglobin A1c    Collection Time: 10/15/24  2:22 PM   Result Value Ref Range    Estimated Average Glucose 192 (H) <117 mg/dL    Hemoglobin A1C 8.3 (H) 0.0 - 5.6 %   Basic metabolic panel  (Ca, Cl, CO2, Creat, Gluc, K, Na, BUN)    Collection Time: 10/15/24  2:22 PM   Result Value Ref Range    Sodium 134 (L) 135 - 145 mmol/L    Potassium 5.0 3.4 - 5.3 mmol/L    Chloride 97 (L) 98 - 107 mmol/L    Carbon Dioxide (CO2) 26 22 - 29 mmol/L    Anion Gap 11 7 - 15 mmol/L    Urea Nitrogen 12.0 6.0 - 20.0 mg/dL    Creatinine 0.94 0.67 - 1.17 mg/dL    GFR Estimate >90 >60 mL/min/1.73m2    Calcium 10.3 8.8 - 10.4 mg/dL    Glucose 197 (H) 70 - 99 mg/dL      No EKG required, no history of coronary heart disease, significant arrhythmia, peripheral arterial disease or other structural heart disease.    Revised Cardiac Risk Index (RCRI)  The patient has the following serious cardiovascular risks for perioperative complications:   - No serious cardiac risks = 0 points     RCRI " Interpretation: 0 points: Class I (very low risk - 0.4% complication rate)         Signed Electronically by: Lisette Landry PA-C  A copy of this evaluation report is provided to the requesting physician.

## 2024-10-15 NOTE — PATIENT INSTRUCTIONS
How to Take Your Medication Before Surgery  Preoperative Medication Instructions   Antiplatelet or Anticoagulation Medication Instructions   - Patient is on no antiplatelet or anticoagulation medications.    Additional Medication Instructions  Take all scheduled medications on the day of surgery EXCEPT for modifications listed below:   - do not take Lisinopril the morning of surgery     Do not take 1 week prior to surgery - ibuprofen.     Patient Education   Preparing for Your Surgery  For Adults  Getting started  In most cases, a nurse will call to review your health history and instructions. They will give you an arrival time based on your scheduled surgery time. Please be ready to share:  Your doctor's clinic name and phone number  Your medical, surgical, and anesthesia history  A list of allergies and sensitivities  A list of medicines, including herbal treatments and over-the-counter drugs  Whether the patient has a legal guardian (ask how to send us the papers in advance)  Note: You may not receive a call if you were seen at our PAC (Preoperative Assessment Center).  Please tell us if you're pregnant--or if there's any chance you might be pregnant. Some surgeries may injure a fetus (unborn baby), so they require a pregnancy test. Surgeries that are safe for a fetus don't always need a test, and you can choose whether to have one.   Preparing for surgery  Within 10 to 30 days of surgery: Have a pre-op exam (sometimes called an H&P, or History and Physical). This can be done at a clinic or pre-operative center.  If you're having a , you may not need this exam. Talk to your care team.  At your pre-op exam, talk to your care team about all medicines you take. (This includes CBD oil and any drugs, such as THC, marijuana, and other forms of cannabis.) If you need to stop any medicine before surgery, ask when to start taking it again.  This is for your safety. Many medicines and drugs can make you bleed too  much during surgery. Some change how well surgery (anesthesia) drugs work.  Call your insurance company to let them know you're having surgery. (If you don't have insurance, call [phone].)  Call your clinic if there's any change in your health. This includes a scrape or scratch near the surgery site, or any signs of a cold (sore throat, runny nose, cough, rash, fever).  Eating and drinking guidelines  For your safety: Unless your surgeon tells you otherwise, follow the guidelines below.  Eat and drink as normal until 8 hours before you arrive for surgery. After that, no food or milk. You can spit out gum when you arrive.  Drink clear liquids until 2 hours before you arrive. These are liquids you can see through, like water, Gatorade, and Propel Water. They also include plain black coffee and tea (no cream or milk).  No alcohol for 24 hours before you arrive. The night before surgery, stop any drinks that contain THC.  If your care team tells you to take medicine on the morning of surgery, it's okay to take it with a sip of water. No other medicines or drugs are allowed (including CBD oil)--follow your care team's instructions.  If you have questions the day of surgery, call your hospital or surgery center.   Preventing infection  Shower or bathe the night before and the morning of surgery. Follow the instructions your clinic gave you. (If no instructions, use regular soap.)  Don't shave or clip hair near your surgery site. We'll remove the hair if needed.  Don't smoke or vape the morning of surgery. No chewing tobacco for 6 hours before you arrive. A nicotine patch is okay. You may spit out nicotine gum when you arrive.  For some surgeries, the surgeon will tell you to fully quit smoking and nicotine.  We will make every effort to keep you safe from infection. We will:  Clean our hands often with soap and water (or an alcohol-based hand rub).  Clean the skin at your surgery site with a special soap that kills  germs.  Give you a special gown to keep you warm. (Cold raises the risk of infection.)  Wear hair covers, masks, gowns, and gloves during surgery.  Give antibiotic medicine, if prescribed. Not all surgeries need this medicine.  What to bring on the day of surgery  Photo ID and insurance card  Copy of your health care directive, if you have one  Glasses and hearing aids (bring cases)  You can't wear contacts during surgery  Inhaler and eye drops, if you use them (tell us about these when you arrive)  CPAP machine or breathing device, if you use them  A few personal items, if spending the night  If you have . . .  A pacemaker, ICD (cardiac defibrillator), or other implant: Bring the ID card.  An implanted stimulator: Bring the remote control.  A legal guardian: Bring a copy of the certified (court-stamped) guardianship papers.  Please remove any jewelry, including body piercings. Leave jewelry and other valuables at home.  If you're going home the day of surgery  You must have a responsible adult drive you home. They should stay with you overnight as well.  If you don't have someone to stay with you, and you aren't safe to go home alone, we may keep you overnight. Insurance often won't pay for this.  After surgery  If it's hard to control your pain or you need more pain medicine, please call your surgeon's office.  Questions?   If you have any questions for your care team, list them here:   ____________________________________________________________________________________________________________________________________________________________________________________________________________________________________________________________  For informational purposes only. Not to replace the advice of your health care provider. Copyright   2003, 2019 [facility]. All rights reserved. Clinically reviewed by Ganesh Middleton MD. [employer] [id number] - REV 08/24.

## 2024-10-18 PROBLEM — E11.9 DIABETES MELLITUS, TYPE 2 (H): Status: ACTIVE | Noted: 2024-10-18

## 2024-10-21 ENCOUNTER — MYC MEDICAL ADVICE (OUTPATIENT)
Dept: INTERNAL MEDICINE | Facility: CLINIC | Age: 59
End: 2024-10-21
Payer: COMMERCIAL

## 2024-10-24 ENCOUNTER — ALLIED HEALTH/NURSE VISIT (OUTPATIENT)
Dept: EDUCATION SERVICES | Facility: CLINIC | Age: 59
End: 2024-10-24
Attending: PHYSICIAN ASSISTANT
Payer: COMMERCIAL

## 2024-10-24 DIAGNOSIS — E11.9 TYPE 2 DIABETES MELLITUS WITHOUT COMPLICATION, WITHOUT LONG-TERM CURRENT USE OF INSULIN (H): Primary | ICD-10-CM

## 2024-10-24 DIAGNOSIS — E11.9 TYPE 2 DIABETES MELLITUS WITHOUT COMPLICATION, WITHOUT LONG-TERM CURRENT USE OF INSULIN (H): ICD-10-CM

## 2024-10-24 PROCEDURE — G0108 DIAB MANAGE TRN  PER INDIV: HCPCS | Performed by: DIETITIAN, REGISTERED

## 2024-10-24 RX ORDER — LANCETS
100 EACH MISCELLANEOUS 3 TIMES DAILY
Qty: 100 EACH | Refills: 4 | Status: SHIPPED | OUTPATIENT
Start: 2024-10-24

## 2024-10-24 NOTE — Clinical Note
10/24/2024         RE: Mark Roy  [address] [place] [address]        Dear Colleague,    Thank you for referring your patient, Mark Roy, to the [facility]. Please see a copy of my visit note below.    Diabetes Self-Management Education & Support  Presents for: Initial Assessment for new diagnosis    Type of Service: In Person Visit    ASSESSMENT:  Luis is interested in learning more about caring for Type 2 diabetes. Discussed glucose in relation to upcoming hip surgery in 3 weeks. Fasting glucose (177 mg/dL today in clinic) and A1c elevated. Discussed diabetes medication options, specifically Jardiance risks and side effects. Per [facility] pharmacist, his insurance covers at $0 copay. He has been working on weight loss as well and has lost 20 lb since 2 /2024.     Patient's most recent   Lab Results   Component Value Date    A1C 8.3 10/15/2024    A1C 6.3 07/08/2016     is not meeting goal of <7.0    Diabetes knowledge and skills assessment:   Patient is knowledgeable in diabetes management concepts related to: Healthy Eating    Continue education with the following diabetes management concepts: Monitoring, Taking Medication, Problem Solving, and Reducing Risks    Based on learning assessment above, most appropriate setting for further diabetes education would be: Individual setting.      PLAN  From AVS:  Diabetes Medications:  I pended Jardiance to your provider. Please start 10 mg once daily.     Glucose monitoring:  Check blood sugars fasting and 2 hours after meal. Pick one after meal test per day and rotate between meals  Fasting and before meal targets:  mg/dL  2 hours after start of meal: <180 mg/dL  Keep a blood glucose record for next visit.    Meal Plan Recommendation:   Use portion control and plate planning method.  Carbohydrates to aim for at meals: 30-45 grams and snacks: 0-30 grams.  Use diabetesfoodhub.org for recipe and meal planning ideas  Use  "[employer] eliza for carbohydrate reference.    Follow-up: 2 weeks    See Care Plan for co-developed, patient-state behavior change goals.  AVS provided for patient today.    Education Materials Provided:   [employer] Understanding Diabetes Booklet, BG Log Sheet, Carbohydrate Counting, and My Plate Planner    SUBJECTIVE/OBJECTIVE:  Presents for: Initial Assessment for new diagnosis  Accompanied by: Self  Diabetes education in the past 24mo: (Patient-Rptd) No  Focus of Visit: Monitoring, Taking Medication, Healthy Eating  Diabetes type: (Patient-Rptd) Type 2  How confident are you filling out medical forms by yourself:: (Patient-Rptd) Extremely  Transportation concerns: No  Other concerns:: None  Cultural Influences/Ethnic Background:  Not  or     Diabetes Symptoms & Complications:  Diabetes Related Symptoms: (Patient-Rptd) None  Weight trend: (Patient-Rptd) Other (see Comments)  Please elaborate:: (Patient-Rptd) Decreasing but i was trying to lose weight.  Symptom course: (Patient-Rptd) Stable     Patient Problem List and Family Medical History reviewed for relevant medical history, current medical status, and diabetes risk factors.    Vitals:  There were no vitals taken for this visit.  Estimated body mass index is 26.72 kg/m  as calculated from the following:    Height as of 8/16/24: 1.93 m (6' 3.98\").    Weight as of 10/15/24: 99.5 kg (219 lb 6.4 oz).   Last 3 BP:   BP Readings from Last 3 Encounters:   10/15/24 132/80   08/16/24 (!) 150/86   05/31/23 (!) 150/96       History   Smoking Status    Never   Smokeless Tobacco    Never       Labs:  Lab Results   Component Value Date    A1C 8.3 10/15/2024    A1C 6.3 07/08/2016     Lab Results   Component Value Date     10/15/2024     07/08/2016     Lab Results   Component Value Date    LDL 96 08/16/2024     05/16/2016     HDL Cholesterol   Date Value Ref Range Status   05/16/2016 65 >39 mg/dL Final     Direct Measure HDL   Date " "Value Ref Range Status   08/16/2024 55 >=40 mg/dL Final   ]  GFR Estimate   Date Value Ref Range Status   10/15/2024 >90 >60 mL/min/1.73m2 Final     Comment:     eGFR calculated using 2021 CKD-EPI equation.   05/16/2016 >90  Non  GFR Calc   >60 mL/min/1.7m2 Final     GFR Estimate If Black   Date Value Ref Range Status   05/16/2016 >90   GFR Calc   >60 mL/min/1.7m2 Final     Lab Results   Component Value Date    CR 0.94 10/15/2024    CR 0.88 05/16/2016     No results found for: \"MICROALBUMIN\"    Healthy Eating:  Healthy Eating Assessed Today: Yes  Cultural/Christianity diet restrictions?: (Patient-Rptd) No  Do you have any food allergies or intolerances?: No  Meal planning/habits: Smaller portions  Who cooks/prepares meals for you?: Spouse, Self  Who purchases food in  your home?: Self, Spouse  How many times a week on average do you eat food made away from home (restaurant/take-out)?: (Patient-Rptd) 2  Meals include: (Patient-Rptd) Lunch, Dinner, Morning Snack  Breakfast: coffee, fruit and yogurt OR sausage and yogurt: 1-2 CHO choices  Lunch: [place] or leftovers: 2-3 CHO choices  Dinner: Pork with 2/3 cup rice and peppers OR English muffin with cheese, eggs and sausage and 1 cup milk: 2-3 CHO choices  Other: Lost 15 lb in the past 5 months  Beverages: (Patient-Rptd) Water, Coffee, Diet soda  Has patient met with a dietitian in the past?: No    Being Active:  Being Active Assessed Today: Yes  Exercise:: Currently not exercising (Hip surgery 11/12/24)  Barrier to exercise: (Patient-Rptd) Physical limitation    Monitoring:  Monitoring Assessed Today: Yes    Taking Medications:  Taking Medication Assessed Today: Yes  Current Treatments: None, Diet    Problem Solving:  Problem Solving Assessed Today: Yes  Is the patient at risk for hypoglycemia?: No    Reducing Risks:  Reducing Risks Assessed Today: Yes  Diabetes Risks: Age over 45 years, Family History (Father)  CAD Risks: Male sex, " Diabetes Mellitus  Has dilated eye exam at least once a year?: (Patient-Rptd) No  Sees dentist every 6 months?: (Patient-Rptd) No  Feet checked by healthcare provider in the last year?: (Patient-Rptd) Yes    Healthy Coping:  Healthy Coping Assessed Today: Yes  Emotional response to diabetes: Ready to learn  Informal Support system:: (Patient-Rptd) Family, Friends, Significant other  Stage of change: ACTION (Actively working towards change)  Support resources: Websites  Patient Activation Measure Survey Score:      11/29/2012    10:00 AM   ARBEN Score (Last Two)   ARBEN Raw Score 52   Activation Score 100   ARBEN Level 4     Care Plan and Education Provided:  Healthy Eating: Balanced meals, Carbohydrate Counting, and Consistency in amount and timing of carbohydrate intake, Monitoring: Blood glucose versus Continuous Glucose Monitoring, Frequency of monitoring, Individual glucose targets, Log and interpret results, Purpose, and Proper technique, Taking Medication: Action of prescribed medication(s), Side effects of prescribed medication(s), and When to take medication(s), and Problem Solving: High glucose - causes, signs/symptoms, treatment and prevention and When to call a health care provider    Delphine Fletcher RDN, LD, [facility]ES    Time Spent: 60 minutes  Encounter Type: Individual    Any diabetes medication dose changes were made via the CDE Protocol per the patient's referring provider. A copy of this encounter was shared with the provider.

## 2024-10-24 NOTE — LETTER
10/24/2024         RE: Mark Roy  [address] [place] [address]        Dear Colleague,    Thank you for referring your patient, Mark Roy, to the [facility]. Please see a copy of my visit note below.    Diabetes Self-Management Education & Support  Presents for: Initial Assessment for new diagnosis    Type of Service: In Person Visit    ASSESSMENT:  Luis is interested in learning more about caring for Type 2 diabetes. Discussed glucose in relation to upcoming hip surgery in 3 weeks. Fasting glucose (177 mg/dL today in clinic) and A1c elevated. Discussed diabetes medication options, specifically Jardiance risks and side effects. Per [facility] pharmacist, his insurance covers at $0 copay. He has been working on weight loss as well and has lost 20 lb since 2 /2024.     Instructed on BG meter and use. He did a test in clinic with good understanding. Fasting glucose 177 mg/dL.     Patient's most recent   Lab Results   Component Value Date    A1C 8.3 10/15/2024    A1C 6.3 07/08/2016     is not meeting goal of <7.0    Diabetes knowledge and skills assessment:   Patient is knowledgeable in diabetes management concepts related to: Healthy Eating    Continue education with the following diabetes management concepts: Monitoring, Taking Medication, Problem Solving, and Reducing Risks    Based on learning assessment above, most appropriate setting for further diabetes education would be: Individual setting.      PLAN  From AVS:  Diabetes Medications:  I pended Jardiance to your provider. Please start 10 mg once daily.     Glucose monitoring:  Prescriptions sent to pharmacy today.   Check blood sugars fasting and 2 hours after meal. Pick one after meal test per day and rotate between meals  Fasting and before meal targets:  mg/dL  2 hours after start of meal: <180 mg/dL  Keep a blood glucose record for next visit.    Meal Plan Recommendation:   Use portion control and plate  "planning method.  Carbohydrates to aim for at meals: 30-45 grams and snacks: 0-30 grams.  Use diabetesPutney.org for recipe and meal planning ideas  Use [employer] eliza for carbohydrate reference.    Follow-up: 2 weeks    See Care Plan for co-developed, patient-state behavior change goals.  AVS provided for patient today.    Education Materials Provided:   [employer] Understanding Diabetes Booklet, BG Log Sheet, Carbohydrate Counting, and My Plate Planner    SUBJECTIVE/OBJECTIVE:  Presents for: Initial Assessment for new diagnosis  Accompanied by: Self  Diabetes education in the past 24mo: (Patient-Rptd) No  Focus of Visit: Monitoring, Taking Medication, Healthy Eating  Diabetes type: (Patient-Rptd) Type 2  How confident are you filling out medical forms by yourself:: (Patient-Rptd) Extremely  Transportation concerns: No  Other concerns:: None  Cultural Influences/Ethnic Background:  Not  or     Diabetes Symptoms & Complications:  Diabetes Related Symptoms: (Patient-Rptd) None  Weight trend: (Patient-Rptd) Other (see Comments)  Please elaborate:: (Patient-Rptd) Decreasing but i was trying to lose weight.  Symptom course: (Patient-Rptd) Stable     Patient Problem List and Family Medical History reviewed for relevant medical history, current medical status, and diabetes risk factors.    Vitals:  There were no vitals taken for this visit.  Estimated body mass index is 26.72 kg/m  as calculated from the following:    Height as of 8/16/24: 1.93 m (6' 3.98\").    Weight as of 10/15/24: 99.5 kg (219 lb 6.4 oz).   Last 3 BP:   BP Readings from Last 3 Encounters:   10/15/24 132/80   08/16/24 (!) 150/86   05/31/23 (!) 150/96       History   Smoking Status    Never   Smokeless Tobacco    Never       Labs:  Lab Results   Component Value Date    A1C 8.3 10/15/2024    A1C 6.3 07/08/2016     Lab Results   Component Value Date     10/15/2024     07/08/2016     Lab Results   Component Value Date    " "LDL 96 08/16/2024     05/16/2016     HDL Cholesterol   Date Value Ref Range Status   05/16/2016 65 >39 mg/dL Final     Direct Measure HDL   Date Value Ref Range Status   08/16/2024 55 >=40 mg/dL Final   ]  GFR Estimate   Date Value Ref Range Status   10/15/2024 >90 >60 mL/min/1.73m2 Final     Comment:     eGFR calculated using 2021 CKD-EPI equation.   05/16/2016 >90  Non  GFR Calc   >60 mL/min/1.7m2 Final     GFR Estimate If Black   Date Value Ref Range Status   05/16/2016 >90   GFR Calc   >60 mL/min/1.7m2 Final     Lab Results   Component Value Date    CR 0.94 10/15/2024    CR 0.88 05/16/2016     No results found for: \"MICROALBUMIN\"    Healthy Eating:  Healthy Eating Assessed Today: Yes  Cultural/Nondenominational diet restrictions?: (Patient-Rptd) No  Do you have any food allergies or intolerances?: No  Meal planning/habits: Smaller portions  Who cooks/prepares meals for you?: Spouse, Self  Who purchases food in  your home?: Self, Spouse  How many times a week on average do you eat food made away from home (restaurant/take-out)?: (Patient-Rptd) 2  Meals include: (Patient-Rptd) Lunch, Dinner, Morning Snack  Breakfast: coffee, fruit and yogurt OR sausage and yogurt: 1-2 CHO choices  Lunch: [place] or leftovers: 2-3 CHO choices  Dinner: Pork with 2/3 cup rice and peppers OR English muffin with cheese, eggs and sausage and 1 cup milk: 2-3 CHO choices  Other: Lost 15 lb in the past 5 months  Beverages: (Patient-Rptd) Water, Coffee, Diet soda  Has patient met with a dietitian in the past?: No    Being Active:  Being Active Assessed Today: Yes  Exercise:: Currently not exercising (Hip surgery 11/12/24)  Barrier to exercise: (Patient-Rptd) Physical limitation    Monitoring:  Monitoring Assessed Today: Yes    Taking Medications:  Taking Medication Assessed Today: Yes  Current Treatments: None, Diet    Problem Solving:  Problem Solving Assessed Today: Yes  Is the patient at risk for " hypoglycemia?: No    Reducing Risks:  Reducing Risks Assessed Today: Yes  Diabetes Risks: Age over 45 years, Family History (Father)  CAD Risks: Male sex, Diabetes Mellitus  Has dilated eye exam at least once a year?: (Patient-Rptd) No  Sees dentist every 6 months?: (Patient-Rptd) No  Feet checked by healthcare provider in the last year?: (Patient-Rptd) Yes    Healthy Coping:  Healthy Coping Assessed Today: Yes  Emotional response to diabetes: Ready to learn  Informal Support system:: (Patient-Rptd) Family, Friends, Significant other  Stage of change: ACTION (Actively working towards change)  Support resources: Websites  Patient Activation Measure Survey Score:      11/29/2012    10:00 AM   ARBEN Score (Last Two)   ARBEN Raw Score 52   Activation Score 100   ARBEN Level 4     Care Plan and Education Provided:  Healthy Eating: Balanced meals, Carbohydrate Counting, and Consistency in amount and timing of carbohydrate intake, Monitoring: Blood glucose versus Continuous Glucose Monitoring, Frequency of monitoring, Individual glucose targets, Log and interpret results, Purpose, and Proper technique, Taking Medication: Action of prescribed medication(s), Side effects of prescribed medication(s), and When to take medication(s), and Problem Solving: High glucose - causes, signs/symptoms, treatment and prevention and When to call a health care provider    Delphine Fletcher RDN, EMILY, [facility]    Time Spent: 60 minutes  Encounter Type: Individual    Any diabetes medication dose changes were made via the CDE Protocol per the patient's referring provider. A copy of this encounter was shared with the provider.

## 2024-10-24 NOTE — PROGRESS NOTES
Diabetes Self-Management Education & Support  Presents for: Initial Assessment for new diagnosis    Type of Service: In Person Visit    ASSESSMENT:  Luis is interested in learning more about caring for Type 2 diabetes. Discussed glucose in relation to upcoming hip surgery in 3 weeks. Fasting glucose (177 mg/dL today in clinic) and A1c elevated. Discussed diabetes medication options, specifically Jardiance risks and side effects. Per [facility] pharmacist, his insurance covers at $0 copay. He has been working on weight loss as well and has lost 20 lb since 2 /2024.     Instructed on BG meter and use. He did a test in clinic with good understanding. Fasting glucose 177 mg/dL.     Patient's most recent   Lab Results   Component Value Date    A1C 8.3 10/15/2024    A1C 6.3 07/08/2016     is not meeting goal of <7.0    Diabetes knowledge and skills assessment:   Patient is knowledgeable in diabetes management concepts related to: Healthy Eating    Continue education with the following diabetes management concepts: Monitoring, Taking Medication, Problem Solving, and Reducing Risks    Based on learning assessment above, most appropriate setting for further diabetes education would be: Individual setting.      PLAN  From AVS:  Diabetes Medications:  I pended Jardiance to your provider. Please start 10 mg once daily.     Glucose monitoring:  Prescriptions sent to pharmacy today.   Check blood sugars fasting and 2 hours after meal. Pick one after meal test per day and rotate between meals  Fasting and before meal targets:  mg/dL  2 hours after start of meal: <180 mg/dL  Keep a blood glucose record for next visit.    Meal Plan Recommendation:   Use portion control and plate planning method.  Carbohydrates to aim for at meals: 30-45 grams and snacks: 0-30 grams.  Use diabetesfoPet Readyb.org for recipe and meal planning ideas  Use [employer] eliza for carbohydrate reference.    Follow-up: 2 weeks    See Care Plan for co-developed,  "patient-state behavior change goals.  AVS provided for patient today.    Education Materials Provided:   [employer] Understanding Diabetes Booklet, BG Log Sheet, Carbohydrate Counting, and My Plate Planner    SUBJECTIVE/OBJECTIVE:  Presents for: Initial Assessment for new diagnosis  Accompanied by: Self  Diabetes education in the past 24mo: (Patient-Rptd) No  Focus of Visit: Monitoring, Taking Medication, Healthy Eating  Diabetes type: (Patient-Rptd) Type 2  How confident are you filling out medical forms by yourself:: (Patient-Rptd) Extremely  Transportation concerns: No  Other concerns:: None  Cultural Influences/Ethnic Background:  Not  or     Diabetes Symptoms & Complications:  Diabetes Related Symptoms: (Patient-Rptd) None  Weight trend: (Patient-Rptd) Other (see Comments)  Please elaborate:: (Patient-Rptd) Decreasing but i was trying to lose weight.  Symptom course: (Patient-Rptd) Stable     Patient Problem List and Family Medical History reviewed for relevant medical history, current medical status, and diabetes risk factors.    Vitals:  There were no vitals taken for this visit.  Estimated body mass index is 26.72 kg/m  as calculated from the following:    Height as of 8/16/24: 1.93 m (6' 3.98\").    Weight as of 10/15/24: 99.5 kg (219 lb 6.4 oz).   Last 3 BP:   BP Readings from Last 3 Encounters:   10/15/24 132/80   08/16/24 (!) 150/86   05/31/23 (!) 150/96       History   Smoking Status    Never   Smokeless Tobacco    Never       Labs:  Lab Results   Component Value Date    A1C 8.3 10/15/2024    A1C 6.3 07/08/2016     Lab Results   Component Value Date     10/15/2024     07/08/2016     Lab Results   Component Value Date    LDL 96 08/16/2024     05/16/2016     HDL Cholesterol   Date Value Ref Range Status   05/16/2016 65 >39 mg/dL Final     Direct Measure HDL   Date Value Ref Range Status   08/16/2024 55 >=40 mg/dL Final   ]  GFR Estimate   Date Value Ref Range Status " "  10/15/2024 >90 >60 mL/min/1.73m2 Final     Comment:     eGFR calculated using 2021 CKD-EPI equation.   05/16/2016 >90  Non  GFR Calc   >60 mL/min/1.7m2 Final     GFR Estimate If Black   Date Value Ref Range Status   05/16/2016 >90   GFR Calc   >60 mL/min/1.7m2 Final     Lab Results   Component Value Date    CR 0.94 10/15/2024    CR 0.88 05/16/2016     No results found for: \"MICROALBUMIN\"    Healthy Eating:  Healthy Eating Assessed Today: Yes  Cultural/Congregation diet restrictions?: (Patient-Rptd) No  Do you have any food allergies or intolerances?: No  Meal planning/habits: Smaller portions  Who cooks/prepares meals for you?: Spouse, Self  Who purchases food in  your home?: Self, Spouse  How many times a week on average do you eat food made away from home (restaurant/take-out)?: (Patient-Rptd) 2  Meals include: (Patient-Rptd) Lunch, Dinner, Morning Snack  Breakfast: coffee, fruit and yogurt OR sausage and yogurt: 1-2 CHO choices  Lunch: [place] or leftovers: 2-3 CHO choices  Dinner: Pork with 2/3 cup rice and peppers OR English muffin with cheese, eggs and sausage and 1 cup milk: 2-3 CHO choices  Other: Lost 15 lb in the past 5 months  Beverages: (Patient-Rptd) Water, Coffee, Diet soda  Has patient met with a dietitian in the past?: No    Being Active:  Being Active Assessed Today: Yes  Exercise:: Currently not exercising (Hip surgery 11/12/24)  Barrier to exercise: (Patient-Rptd) Physical limitation    Monitoring:  Monitoring Assessed Today: Yes    Taking Medications:  Taking Medication Assessed Today: Yes  Current Treatments: None, Diet    Problem Solving:  Problem Solving Assessed Today: Yes  Is the patient at risk for hypoglycemia?: No    Reducing Risks:  Reducing Risks Assessed Today: Yes  Diabetes Risks: Age over 45 years, Family History (Father)  CAD Risks: Male sex, Diabetes Mellitus  Has dilated eye exam at least once a year?: (Patient-Rptd) No  Sees dentist every 6 " months?: (Patient-Rptd) No  Feet checked by healthcare provider in the last year?: (Patient-Rptd) Yes    Healthy Coping:  Healthy Coping Assessed Today: Yes  Emotional response to diabetes: Ready to learn  Informal Support system:: (Patient-Rptd) Family, Friends, Significant other  Stage of change: ACTION (Actively working towards change)  Support resources: Websites  Patient Activation Measure Survey Score:      11/29/2012    10:00 AM   ARBEN Score (Last Two)   ARBEN Raw Score 52   Activation Score 100   ARBEN Level 4     Care Plan and Education Provided:  Healthy Eating: Balanced meals, Carbohydrate Counting, and Consistency in amount and timing of carbohydrate intake, Monitoring: Blood glucose versus Continuous Glucose Monitoring, Frequency of monitoring, Individual glucose targets, Log and interpret results, Purpose, and Proper technique, Taking Medication: Action of prescribed medication(s), Side effects of prescribed medication(s), and When to take medication(s), and Problem Solving: High glucose - causes, signs/symptoms, treatment and prevention and When to call a health care provider    Delphine Fletcher RDN, LD, [facility]    Time Spent: 60 minutes  Encounter Type: Individual    Any diabetes medication dose changes were made via the CDE Protocol per the patient's referring provider. A copy of this encounter was shared with the provider.

## 2024-10-24 NOTE — TELEPHONE ENCOUNTER
Luis Salazar has made changes in meal plan recently and has lost weight. Noted upcoming hip surgery in 3 weeks. Fasting glucose today elevated at 177 mg/dL and A1c at 8.3%.     I pended 10 mg Jardiance, if you agree, please sign prescription.    Thank you,  Delphine Fletcher RDN, LD, [facility]

## 2024-10-24 NOTE — PATIENT INSTRUCTIONS
Carlos Brandt is in the class of diabetes medication called SGLT-2 inhibitor. This medication comes in tablet form (either 10 or 25 mg) and allows extra glucose to filter into your urine. A side effect of this could be more frequent urination and/or yeast infections. It is recommended to stay well hydrated with this medication. Because extra glucose is filtered through the kidneys, it is not re-absorbed and this allows for some weight loss. Along with potential weight loss benefits, there are also cardiovascular and renal protection benefits and improved blood sugars.     Diabetes Medications:  I pended Jardiance to your provider. Please start 10 mg once daily.     Glucose monitoring:  Check blood sugars fasting and 2 hours after meal. Pick one after meal test per day and rotate between meals  Fasting and before meal targets:  mg/dL  2 hours after start of meal: <180 mg/dL  Keep a blood glucose record for next visit.    Meal Plan Recommendation:   Use portion control and plate planning method.  Carbohydrates to aim for at meals: 30-45 grams and snacks: 0-30 grams.  Use diabetesfoPackLate.comb.org for recipe and meal planning ideas  Use [employer] eliza for carbohydrate reference.    Delphine Fletcher RDN, EMILY, [facility]  Diabetes Education Triage Line: [phone] voicemail  Diabetes Education Appointment [phone]

## 2024-11-07 ENCOUNTER — ALLIED HEALTH/NURSE VISIT (OUTPATIENT)
Dept: EDUCATION SERVICES | Facility: CLINIC | Age: 59
End: 2024-11-07
Payer: COMMERCIAL

## 2024-11-07 VITALS — WEIGHT: 210 LBS | BODY MASS INDEX: 25.57 KG/M2

## 2024-11-07 DIAGNOSIS — E11.9 TYPE 2 DIABETES MELLITUS WITHOUT COMPLICATION, WITHOUT LONG-TERM CURRENT USE OF INSULIN (H): Primary | ICD-10-CM

## 2024-11-07 PROCEDURE — G0108 DIAB MANAGE TRN  PER INDIV: HCPCS | Performed by: DIETITIAN, REGISTERED

## 2024-11-07 NOTE — LETTER
11/7/2024         RE: Mark Roy  [address] [place] [address]        Dear Colleague,    Thank you for referring your patient, Mark Roy, to the [facility]. Please see a copy of my visit note below.    Diabetes Self-Management Education & Support  Presents for: Follow-up    Type of Service: In Person Visit    ASSESSMENT:  Luis is doing well with diabetes management skills. BG nicely improved on 10 mg Jardiance and he is tolerating this well. Sent prescription for 25 mg Jardiance once he has completed one month of 10 mg dose. He is having hip surgery next Tuesday and would like to follow up in about 2 months and obtain A1c at that time. Discussed sick day guidelines and alcohol safety while on Jardiance.     Patient's most recent   Lab Results   Component Value Date    A1C 8.3 10/15/2024    A1C 6.3 07/08/2016     is not meeting goal of <7.0    Diabetes knowledge and skills assessment:   Patient is knowledgeable in diabetes management concepts related to: Healthy Eating, Monitoring, Taking Medication, and Problem Solving    Continue education with the following diabetes management concepts: Being Active and Reducing Risks    Based on learning assessment above, most appropriate setting for further diabetes education would be: Individual setting.      PLAN  Diabetes Medications:  Continue Jardiance as prescribed.     Glucose monitoring:  Check blood sugars fasting and 2 hours after meal. Pick one after meal test per day and rotate between meals  Fasting and before meal targets:  mg/dL  2 hours after start of meal: <180 mg/dL    Meal Plan Recommendation:   Use portion control and plate planning method.  Carbohydrates to aim for at meals: 45-60 grams and snacks: 0-30 grams.  Use diabetesfoodhub.org for recipe and meal planning ideas  Use [employer] eliza for carbohydrate reference.    Exercise / activity plan:   Per PT after hip surgery    Follow-up: January    See  "Care Plan for co-developed, patient-state behavior change goals.    SUBJECTIVE/OBJECTIVE:  Presents for: Follow-up  Accompanied by: Self  Diabetes education in the past 24mo: No  Focus of Visit: Monitoring, Taking Medication, Healthy Eating  Diabetes type: Type 2  How confident are you filling out medical forms by yourself:: Extremely  Transportation concerns: No  Other concerns:: None  Cultural Influences/Ethnic Background:  Not  or     Diabetes Symptoms & Complications:  Diabetes Related Symptoms: None  Weight trend: Other (see Comments)  Please elaborate:: Decreasing but i was trying to lose weight.  Symptom course: Stable     Patient Problem List and Family Medical History reviewed for relevant medical history, current medical status, and diabetes risk factors.    Vitals:  Wt 95.3 kg (210 lb)   BMI 25.57 kg/m    Estimated body mass index is 25.57 kg/m  as calculated from the following:    Height as of 8/16/24: 1.93 m (6' 3.98\").    Weight as of this encounter: 95.3 kg (210 lb).   Last 3 BP:   BP Readings from Last 3 Encounters:   10/15/24 132/80   08/16/24 (!) 150/86   05/31/23 (!) 150/96       History   Smoking Status    Never   Smokeless Tobacco    Never       Labs:  Lab Results   Component Value Date    A1C 8.3 10/15/2024    A1C 6.3 07/08/2016     Lab Results   Component Value Date     10/15/2024     07/08/2016     Lab Results   Component Value Date    LDL 96 08/16/2024     05/16/2016     HDL Cholesterol   Date Value Ref Range Status   05/16/2016 65 >39 mg/dL Final     Direct Measure HDL   Date Value Ref Range Status   08/16/2024 55 >=40 mg/dL Final   ]  GFR Estimate   Date Value Ref Range Status   10/15/2024 >90 >60 mL/min/1.73m2 Final     Comment:     eGFR calculated using 2021 CKD-EPI equation.   05/16/2016 >90  Non  GFR Calc   >60 mL/min/1.7m2 Final     GFR Estimate If Black   Date Value Ref Range Status   05/16/2016 >90   GFR Calc   >60 " "mL/min/1.7m2 Final     Lab Results   Component Value Date    CR 0.94 10/15/2024    CR 0.88 05/16/2016     No results found for: \"MICROALBUMIN\"    Healthy Eating:  Healthy Eating Assessed Today: Yes  Cultural/Voodoo diet restrictions?: No  Do you have any food allergies or intolerances?: No  Meal planning/habits: Smaller portions  Who cooks/prepares meals for you?: Spouse, Self  Who purchases food in  your home?: Self, Spouse  How many times a week on average do you eat food made away from home (restaurant/take-out)?: 2  Meals include: Lunch, Dinner, Morning Snack  Breakfast: coffee, fruit and yogurt OR sausage and yogurt: 1-2 CHO choices  Lunch: [place] or leftovers: 2-3 CHO choices OR chili  Dinner: Pork with 2/3 cup rice and peppers OR English muffin with cheese, eggs and sausage and 1 cup milk: 2-3 CHO choices OR chicken breast with rice and veggies  Other: Lost 15 lb in the past 5 months  Beverages: Water, Coffee, Diet soda  Has patient met with a dietitian in the past?: No    Being Active:  Being Active Assessed Today: Yes  Exercise:: Currently not exercising (Hip surgery 11/12/24)  Barrier to exercise: Physical limitation    Monitoring:  Monitoring Assessed Today: Yes  Did patient bring glucose meter to appointment? : Yes  Times checking blood sugar at home (number): 2  Times checking blood sugar at home (per): Day  Blood glucose trend: Decreasing    Date Breakfast  Lunch  Dinner  Bedtime    Before After Before After Before After    11/7 120         11/6 130   156  134    11/5 159     124    11/4 146     150    11/3 158     128    11/2 102      136   11/1 126     141        Taking Medications:  Diabetes Medication(s)       Sodium-Glucose Co-Transporter 2 (SGLT2) Inhibitors       empagliflozin (JARDIANCE) 25 MG TABS tablet Take 1 tablet (25 mg) by mouth daily.     empagliflozin (JARDIANCE) 10 MG TABS tablet Take 1 tablet (10 mg) by mouth daily.            Taking Medication Assessed Today: Yes  Current " Treatments: None, Diet, Oral Medication (taken by mouth)  Problems taking diabetes medications regularly?: No  Diabetes medication side effects?: No    Problem Solving:  Problem Solving Assessed Today: Yes  Is the patient at risk for hypoglycemia?: No    Reducing Risks:  Reducing Risks Assessed Today: Yes  Diabetes Risks: Age over 45 years, Family History (Father)  CAD Risks: Male sex, Diabetes Mellitus  Has dilated eye exam at least once a year?: No  Sees dentist every 6 months?: No  Feet checked by healthcare provider in the last year?: Yes    Healthy Coping:  Healthy Coping Assessed Today: Yes  Emotional response to diabetes: Ready to learn  Informal Support system:: Family, Friends, Significant other  Stage of change: ACTION (Actively working towards change)  Support resources: Websites  Patient Activation Measure Survey Score:      11/29/2012    10:00 AM   ARBEN Score (Last Two)   ARBEN Raw Score 52   Activation Score 100   ARBEN Level 4       Care Plan and Education Provided:  Monitoring: Frequency of monitoring, Individual glucose targets, Log and interpret results, and Purpose, Taking Medication: Action of prescribed medication(s), Side effects of prescribed medication(s), and When to take medication(s), and Problem Solving: High glucose - causes, signs/symptoms, treatment and prevention and When to call a health care provider    Delphine Fletcher RDN, EMILY, CDCES    Time Spent: 30 minutes  Encounter Type: Individual    Any diabetes medication dose changes were made via the CDE Protocol per the patient's referring provider. A copy of this encounter was shared with the provider.

## 2024-11-07 NOTE — Clinical Note
11/7/2024         RE: Mark Roy  [address] [place] [address]        Dear Colleague,    Thank you for referring your patient, Mark Roy, to the [facility]. Please see a copy of my visit note below.    Diabetes Self-Management Education & Support  Presents for: Follow-up    Type of Service: In Person Visit    ASSESSMENT:  Luis is doing well with diabetes management skills. BG nicely improved on 10 mg Jardiance and he is tolerating this well. Sent prescription for 25 mg Jardiance once he has completed one month of 10 mg dose. He is having hip surgery next Tuesday and would like to follow up in about 2 months and obtain A1c at that time. Discussed sick day guidelines and alcohol safety while on Jardiance.     Patient's most recent   Lab Results   Component Value Date    A1C 8.3 10/15/2024    A1C 6.3 07/08/2016     is not meeting goal of <7.0    Diabetes knowledge and skills assessment:   Patient is knowledgeable in diabetes management concepts related to: Healthy Eating, Monitoring, Taking Medication, and Problem Solving    Continue education with the following diabetes management concepts: Being Active and Reducing Risks    Based on learning assessment above, most appropriate setting for further diabetes education would be: Individual setting.      PLAN  Diabetes Medications:  Continue Jardiance as prescribed.     Glucose monitoring:  Check blood sugars fasting and 2 hours after meal. Pick one after meal test per day and rotate between meals  Fasting and before meal targets:  mg/dL  2 hours after start of meal: <180 mg/dL    Meal Plan Recommendation:   Use portion control and plate planning method.  Carbohydrates to aim for at meals: 45-60 grams and snacks: 0-30 grams.  Use diabetesfoodhub.org for recipe and meal planning ideas  Use [employer] eliza for carbohydrate reference.    Exercise / activity plan:   Per PT after hip surgery    Follow-up: January    See  "Care Plan for co-developed, patient-state behavior change goals.    SUBJECTIVE/OBJECTIVE:  Presents for: Follow-up  Accompanied by: Self  Diabetes education in the past 24mo: No  Focus of Visit: Monitoring, Taking Medication, Healthy Eating  Diabetes type: Type 2  How confident are you filling out medical forms by yourself:: Extremely  Transportation concerns: No  Other concerns:: None  Cultural Influences/Ethnic Background:  Not  or     Diabetes Symptoms & Complications:  Diabetes Related Symptoms: None  Weight trend: Other (see Comments)  Please elaborate:: Decreasing but i was trying to lose weight.  Symptom course: Stable     Patient Problem List and Family Medical History reviewed for relevant medical history, current medical status, and diabetes risk factors.    Vitals:  Wt 95.3 kg (210 lb)   BMI 25.57 kg/m    Estimated body mass index is 25.57 kg/m  as calculated from the following:    Height as of 8/16/24: 1.93 m (6' 3.98\").    Weight as of this encounter: 95.3 kg (210 lb).   Last 3 BP:   BP Readings from Last 3 Encounters:   10/15/24 132/80   08/16/24 (!) 150/86   05/31/23 (!) 150/96       History   Smoking Status    Never   Smokeless Tobacco    Never       Labs:  Lab Results   Component Value Date    A1C 8.3 10/15/2024    A1C 6.3 07/08/2016     Lab Results   Component Value Date     10/15/2024     07/08/2016     Lab Results   Component Value Date    LDL 96 08/16/2024     05/16/2016     HDL Cholesterol   Date Value Ref Range Status   05/16/2016 65 >39 mg/dL Final     Direct Measure HDL   Date Value Ref Range Status   08/16/2024 55 >=40 mg/dL Final   ]  GFR Estimate   Date Value Ref Range Status   10/15/2024 >90 >60 mL/min/1.73m2 Final     Comment:     eGFR calculated using 2021 CKD-EPI equation.   05/16/2016 >90  Non  GFR Calc   >60 mL/min/1.7m2 Final     GFR Estimate If Black   Date Value Ref Range Status   05/16/2016 >90   GFR Calc   >60 " "mL/min/1.7m2 Final     Lab Results   Component Value Date    CR 0.94 10/15/2024    CR 0.88 05/16/2016     No results found for: \"MICROALBUMIN\"    Healthy Eating:  Healthy Eating Assessed Today: Yes  Cultural/Quaker diet restrictions?: No  Do you have any food allergies or intolerances?: No  Meal planning/habits: Smaller portions  Who cooks/prepares meals for you?: Spouse, Self  Who purchases food in  your home?: Self, Spouse  How many times a week on average do you eat food made away from home (restaurant/take-out)?: 2  Meals include: Lunch, Dinner, Morning Snack  Breakfast: coffee, fruit and yogurt OR sausage and yogurt: 1-2 CHO choices  Lunch: [place] or leftovers: 2-3 CHO choices OR chili  Dinner: Pork with 2/3 cup rice and peppers OR English muffin with cheese, eggs and sausage and 1 cup milk: 2-3 CHO choices OR chicken breast with rice and veggies  Other: Lost 15 lb in the past 5 months  Beverages: Water, Coffee, Diet soda  Has patient met with a dietitian in the past?: No    Being Active:  Being Active Assessed Today: Yes  Exercise:: Currently not exercising (Hip surgery 11/12/24)  Barrier to exercise: Physical limitation    Monitoring:  Monitoring Assessed Today: Yes  Did patient bring glucose meter to appointment? : Yes  Times checking blood sugar at home (number): 2  Times checking blood sugar at home (per): Day  Blood glucose trend: Decreasing    Date Breakfast  Lunch  Dinner  Bedtime    Before After Before After Before After    11/7 120         11/6 130   156  134    11/5 159     124    11/4 146     150    11/3 158     128    11/2 102      136   11/1 126     141        Taking Medications:  Diabetes Medication(s)       Sodium-Glucose Co-Transporter 2 (SGLT2) Inhibitors       empagliflozin (JARDIANCE) 25 MG TABS tablet Take 1 tablet (25 mg) by mouth daily.     empagliflozin (JARDIANCE) 10 MG TABS tablet Take 1 tablet (10 mg) by mouth daily.            Taking Medication Assessed Today: Yes  Current " Treatments: None, Diet, Oral Medication (taken by mouth)  Problems taking diabetes medications regularly?: No  Diabetes medication side effects?: No    Problem Solving:  Problem Solving Assessed Today: Yes  Is the patient at risk for hypoglycemia?: No    Reducing Risks:  Reducing Risks Assessed Today: Yes  Diabetes Risks: Age over 45 years, Family History (Father)  CAD Risks: Male sex, Diabetes Mellitus  Has dilated eye exam at least once a year?: No  Sees dentist every 6 months?: No  Feet checked by healthcare provider in the last year?: Yes    Healthy Coping:  Healthy Coping Assessed Today: Yes  Emotional response to diabetes: Ready to learn  Informal Support system:: Family, Friends, Significant other  Stage of change: ACTION (Actively working towards change)  Support resources: Websites  Patient Activation Measure Survey Score:      11/29/2012    10:00 AM   ARBEN Score (Last Two)   ARBEN Raw Score 52   Activation Score 100   ARBEN Level 4       Care Plan and Education Provided:  Monitoring: Frequency of monitoring, Individual glucose targets, Log and interpret results, and Purpose, Taking Medication: Action of prescribed medication(s), Side effects of prescribed medication(s), and When to take medication(s), and Problem Solving: High glucose - causes, signs/symptoms, treatment and prevention and When to call a health care provider    Delphine Fletcher RDN, EMILY, CDCES    Time Spent: 30 minutes  Encounter Type: Individual    Any diabetes medication dose changes were made via the CDE Protocol per the patient's referring provider. A copy of this encounter was shared with the provider.

## 2024-11-07 NOTE — PROGRESS NOTES
Diabetes Self-Management Education & Support  Presents for: Follow-up    Type of Service: In Person Visit    ASSESSMENT:  Luis is doing well with diabetes management skills. BG nicely improved on 10 mg Jardiance and he is tolerating this well. Sent prescription for 25 mg Jardiance once he has completed one month of 10 mg dose. He is having hip surgery next Tuesday and would like to follow up in about 2 months and obtain A1c at that time. Discussed sick day guidelines and alcohol safety while on Jardiance.     Patient's most recent   Lab Results   Component Value Date    A1C 8.3 10/15/2024    A1C 6.3 07/08/2016     is not meeting goal of <7.0    Diabetes knowledge and skills assessment:   Patient is knowledgeable in diabetes management concepts related to: Healthy Eating, Monitoring, Taking Medication, and Problem Solving    Continue education with the following diabetes management concepts: Being Active and Reducing Risks    Based on learning assessment above, most appropriate setting for further diabetes education would be: Individual setting.      PLAN  Diabetes Medications:  Continue Jardiance as prescribed.     Glucose monitoring:  Check blood sugars fasting and 2 hours after meal. Pick one after meal test per day and rotate between meals  Fasting and before meal targets:  mg/dL  2 hours after start of meal: <180 mg/dL    Meal Plan Recommendation:   Use portion control and plate planning method.  Carbohydrates to aim for at meals: 45-60 grams and snacks: 0-30 grams.  Use diabetesfoComplete [employer] Technology[employer].org for recipe and meal planning ideas  Use [employer] eliza for carbohydrate reference.    Exercise / activity plan:   Per PT after hip surgery    Follow-up: January    See Care Plan for co-developed, patient-state behavior change goals.    SUBJECTIVE/OBJECTIVE:  Presents for: Follow-up  Accompanied by: Self  Diabetes education in the past 24mo: No  Focus of Visit: Monitoring, Taking Medication, Healthy Eating  Diabetes  "type: Type 2  How confident are you filling out medical forms by yourself:: Extremely  Transportation concerns: No  Other concerns:: None  Cultural Influences/Ethnic Background:  Not  or     Diabetes Symptoms & Complications:  Diabetes Related Symptoms: None  Weight trend: Other (see Comments)  Please elaborate:: Decreasing but i was trying to lose weight.  Symptom course: Stable     Patient Problem List and Family Medical History reviewed for relevant medical history, current medical status, and diabetes risk factors.    Vitals:  Wt 95.3 kg (210 lb)   BMI 25.57 kg/m    Estimated body mass index is 25.57 kg/m  as calculated from the following:    Height as of 8/16/24: 1.93 m (6' 3.98\").    Weight as of this encounter: 95.3 kg (210 lb).   Last 3 BP:   BP Readings from Last 3 Encounters:   10/15/24 132/80   08/16/24 (!) 150/86   05/31/23 (!) 150/96       History   Smoking Status    Never   Smokeless Tobacco    Never       Labs:  Lab Results   Component Value Date    A1C 8.3 10/15/2024    A1C 6.3 07/08/2016     Lab Results   Component Value Date     10/15/2024     07/08/2016     Lab Results   Component Value Date    LDL 96 08/16/2024     05/16/2016     HDL Cholesterol   Date Value Ref Range Status   05/16/2016 65 >39 mg/dL Final     Direct Measure HDL   Date Value Ref Range Status   08/16/2024 55 >=40 mg/dL Final   ]  GFR Estimate   Date Value Ref Range Status   10/15/2024 >90 >60 mL/min/1.73m2 Final     Comment:     eGFR calculated using 2021 CKD-EPI equation.   05/16/2016 >90  Non  GFR Calc   >60 mL/min/1.7m2 Final     GFR Estimate If Black   Date Value Ref Range Status   05/16/2016 >90   GFR Calc   >60 mL/min/1.7m2 Final     Lab Results   Component Value Date    CR 0.94 10/15/2024    CR 0.88 05/16/2016     No results found for: \"MICROALBUMIN\"    Healthy Eating:  Healthy Eating Assessed Today: Yes  Cultural/Orthodox diet restrictions?: No  Do you " have any food allergies or intolerances?: No  Meal planning/habits: Smaller portions  Who cooks/prepares meals for you?: Spouse, Self  Who purchases food in  your home?: Self, Spouse  How many times a week on average do you eat food made away from home (restaurant/take-out)?: 2  Meals include: Lunch, Dinner, Morning Snack  Breakfast: coffee, fruit and yogurt OR sausage and yogurt: 1-2 CHO choices  Lunch: [place] or leftovers: 2-3 CHO choices OR chili  Dinner: Pork with 2/3 cup rice and peppers OR English muffin with cheese, eggs and sausage and 1 cup milk: 2-3 CHO choices OR chicken breast with rice and veggies  Other: Lost 15 lb in the past 5 months  Beverages: Water, Coffee, Diet soda  Has patient met with a dietitian in the past?: No    Being Active:  Being Active Assessed Today: Yes  Exercise:: Currently not exercising (Hip surgery 11/12/24)  Barrier to exercise: Physical limitation    Monitoring:  Monitoring Assessed Today: Yes  Did patient bring glucose meter to appointment? : Yes  Times checking blood sugar at home (number): 2  Times checking blood sugar at home (per): Day  Blood glucose trend: Decreasing    Date Breakfast  Lunch  Dinner  Bedtime    Before After Before After Before After    11/7 120         11/6 130   156  134    11/5 159     124    11/4 146     150    11/3 158     128    11/2 102      136   11/1 126     141        Taking Medications:  Diabetes Medication(s)       Sodium-Glucose Co-Transporter 2 (SGLT2) Inhibitors       empagliflozin (JARDIANCE) 25 MG TABS tablet Take 1 tablet (25 mg) by mouth daily.     empagliflozin (JARDIANCE) 10 MG TABS tablet Take 1 tablet (10 mg) by mouth daily.            Taking Medication Assessed Today: Yes  Current Treatments: None, Diet, Oral Medication (taken by mouth)  Problems taking diabetes medications regularly?: No  Diabetes medication side effects?: No    Problem Solving:  Problem Solving Assessed Today: Yes  Is the patient at risk for hypoglycemia?:  No    Reducing Risks:  Reducing Risks Assessed Today: Yes  Diabetes Risks: Age over 45 years, Family History (Father)  CAD Risks: Male sex, Diabetes Mellitus  Has dilated eye exam at least once a year?: No  Sees dentist every 6 months?: No  Feet checked by healthcare provider in the last year?: Yes    Healthy Coping:  Healthy Coping Assessed Today: Yes  Emotional response to diabetes: Ready to learn  Informal Support system:: Family, Friends, Significant other  Stage of change: ACTION (Actively working towards change)  Support resources: Websites  Patient Activation Measure Survey Score:      11/29/2012    10:00 AM   ARBEN Score (Last Two)   ARBEN Raw Score 52   Activation Score 100   ARBEN Level 4       Care Plan and Education Provided:  Monitoring: Frequency of monitoring, Individual glucose targets, Log and interpret results, and Purpose, Taking Medication: Action of prescribed medication(s), Side effects of prescribed medication(s), and When to take medication(s), and Problem Solving: High glucose - causes, signs/symptoms, treatment and prevention and When to call a health care provider    Delphine Fletcher RDN, LD, [facility]    Time Spent: 30 minutes  Encounter Type: Individual    Any diabetes medication dose changes were made via the CDE Protocol per the patient's referring provider. A copy of this encounter was shared with the provider.

## 2025-01-21 ENCOUNTER — ALLIED HEALTH/NURSE VISIT (OUTPATIENT)
Dept: EDUCATION SERVICES | Facility: CLINIC | Age: 60
End: 2025-01-21
Payer: COMMERCIAL

## 2025-01-21 DIAGNOSIS — E11.9 TYPE 2 DIABETES MELLITUS WITHOUT COMPLICATION, WITHOUT LONG-TERM CURRENT USE OF INSULIN (H): Primary | ICD-10-CM

## 2025-01-21 LAB
EST. AVERAGE GLUCOSE BLD GHB EST-MCNC: 143 MG/DL
HBA1C MFR BLD: 6.6 % (ref 0–5.6)

## 2025-01-21 PROCEDURE — 36415 COLL VENOUS BLD VENIPUNCTURE: CPT | Performed by: DIETITIAN, REGISTERED

## 2025-01-21 PROCEDURE — 83036 HEMOGLOBIN GLYCOSYLATED A1C: CPT | Performed by: DIETITIAN, REGISTERED

## 2025-01-21 NOTE — LETTER
1/21/2025         RE: Mark Roy  [address] [place] [address]        Dear Colleague,    Thank you for referring your patient, Mark Roy, to the [facility]. Please see a copy of my visit note below.    Diabetes Self-Management Education & Support  Presents for: Follow-up    Type of Service: In Person Visit    ASSESSMENT:  Luis is doing well with diabetes management skills. He brought glucose readings today which show numbers in target most all of the time. Positive reinforcement given. He noticed higher glucose after left hip surgery, during the holidays and when he was sick.  Discussed risk reduction today.     Patient's most recent   Lab Results   Component Value Date    A1C 8.3 10/15/2024    A1C 6.3 07/08/2016     is not meeting goal of <7.0    Diabetes knowledge and skills assessment:   Patient is knowledgeable in diabetes management concepts related to: Healthy Eating, Monitoring, Taking Medication, and Problem Solving, Being Active and Reducing Risks    Continue education with the following diabetes management concepts: Education Complete    Based on learning assessment above, most appropriate setting for further diabetes education would be: Individual setting.      PLAN  A1c ordered today    Diabetes Medications:  Continue 25 mg Jardiance daily  as prescribed.     Glucose monitoring:  Check blood sugars fasting and 2 hours after meal. Pick one after meal test per day and rotate between meals.   OK to decrease testing to BID, 2-3 days per week.  Fasting and before meal targets:  mg/dL  2 hours after start of meal: <180 mg/dL    Meal Plan Recommendation:   Use portion control and plate planning method.  Carbohydrates to aim for at meals: 45-60 grams and snacks: 0-30 grams.    Exercise / activity plan:   Per PT after hip surgery    Follow-up: He will schedule in the spring per pt preference    See Care Plan for co-developed, patient-state behavior change  "goals.    SUBJECTIVE/OBJECTIVE:  Presents for: Follow-up  Accompanied by: Self  Diabetes education in the past 24mo: No  Focus of Visit: Monitoring, Taking Medication, Healthy Eating  Diabetes type: Type 2  How confident are you filling out medical forms by yourself:: Extremely  Transportation concerns: No  Other concerns:: None  Cultural Influences/Ethnic Background:  Not  or     Diabetes Symptoms & Complications:  Diabetes Related Symptoms: None  Weight trend: Other (see Comments)  Please elaborate:: Decreasing but I was trying to lose weight.  Symptom course: Stable     Patient Problem List and Family Medical History reviewed for relevant medical history, current medical status, and diabetes risk factors.    Vitals:  There were no vitals taken for this visit.  Estimated body mass index is 25.57 kg/m  as calculated from the following:    Height as of 8/16/24: 1.93 m (6' 3.98\").    Weight as of 11/7/24: 95.3 kg (210 lb).   Last 3 BP:   BP Readings from Last 3 Encounters:   10/15/24 132/80   08/16/24 (!) 150/86   05/31/23 (!) 150/96       History   Smoking Status    Never   Smokeless Tobacco    Never       Labs:  Lab Results   Component Value Date    A1C 8.3 10/15/2024    A1C 6.3 07/08/2016     Lab Results   Component Value Date     10/15/2024     07/08/2016     Lab Results   Component Value Date    LDL 96 08/16/2024     05/16/2016     HDL Cholesterol   Date Value Ref Range Status   05/16/2016 65 >39 mg/dL Final     Direct Measure HDL   Date Value Ref Range Status   08/16/2024 55 >=40 mg/dL Final   ]  GFR Estimate   Date Value Ref Range Status   10/15/2024 >90 >60 mL/min/1.73m2 Final     Comment:     eGFR calculated using 2021 CKD-EPI equation.   05/16/2016 >90  Non  GFR Calc   >60 mL/min/1.7m2 Final     GFR Estimate If Black   Date Value Ref Range Status   05/16/2016 >90   GFR Calc   >60 mL/min/1.7m2 Final     Lab Results   Component Value Date    " "CR 0.94 10/15/2024    CR 0.88 05/16/2016     No results found for: \"MICROALBUMIN\"    Healthy Eating:  Healthy Eating Assessed Today: Yes  Cultural/Mandaen diet restrictions?: No  Do you have any food allergies or intolerances?: No  Meal planning/habits: Smaller portions  Who cooks/prepares meals for you?: Spouse, Self  Who purchases food in  your home?: Self, Spouse  How many times a week on average do you eat food made away from home (restaurant/take-out)?: 2  Meals include: Lunch, Dinner, Morning Snack  Breakfast: coffee, fruit and yogurt OR sausage and yogurt: 1-2 CHO choices  Lunch: [place] or leftovers: 2-3 CHO choices OR chili  Dinner: Pork with 2/3 cup rice and peppers OR English muffin with cheese, eggs and sausage and 1 cup milk: 2-3 CHO choices OR chicken breast with rice and veggies  Other: Lost 15 lb in the past 5 months  Beverages: Water, Coffee, Diet soda  Has patient met with a dietitian in the past?: No    Being Active:  Being Active Assessed Today: Yes  Exercise:: Currently not exercising (Hip surgery 11/12/24)  Barrier to exercise: Physical limitation    Monitoring:  Monitoring Assessed Today: Yes  Did patient bring glucose meter to appointment? : Yes  Times checking blood sugar at home (number): 2  Times checking blood sugar at home (per): Day  Blood glucose trend: Decreasing    Date Breakfast  Lunch  Dinner  Bedtime    Before After Before After Before After    1/21 128         1/20 140     141    1/19 116   123  151    1/18   125       1/17 128 127    127    1/16 131     114    1/15  141    135        Taking Medications:  Diabetes Medication(s)       Sodium-Glucose Co-Transporter 2 (SGLT2) Inhibitors       empagliflozin (JARDIANCE) 25 MG TABS tablet Take 1 tablet (25 mg) by mouth daily.     empagliflozin (JARDIANCE) 10 MG TABS tablet Take 1 tablet (10 mg) by mouth daily.            Taking Medication Assessed Today: Yes  Current Treatments: None, Diet, Oral Medication (taken by mouth)  Problems " taking diabetes medications regularly?: No  Diabetes medication side effects?: No    Problem Solving:  Problem Solving Assessed Today: Yes  Is the patient at risk for hypoglycemia?: No    Reducing Risks:  Reducing Risks Assessed Today: Yes  Diabetes Risks: Age over 45 years, Family History (Father)  CAD Risks: Male sex, Diabetes Mellitus  Has dilated eye exam at least once a year?: No  Sees dentist every 6 months?: No  Feet checked by healthcare provider in the last year?: Yes    Healthy Coping:  Healthy Coping Assessed Today: Yes  Emotional response to diabetes: Ready to learn  Informal Support system:: Family, Friends, Significant other  Stage of change: ACTION (Actively working towards change)  Support resources: Websites  Patient Activation Measure Survey Score:      11/29/2012    10:00 AM   ARBEN Score (Last Two)   ARBEN Raw Score 52   Activation Score 100   ARBEN Level 4       Care Plan and Education Provided:  Healthy Eating: Balanced meals and Plate planning method, Being Active: Precautions to take with exercise and Relationship of activity to glucose, Monitoring: Frequency of monitoring, Individual glucose targets, and Log and interpret results, Taking Medication: Action of prescribed medication(s), Problem Solving: When to call a health care provider, and Reducing Risks: Complications of diabetes, Dental care, Eye care, Foot care, Goal for A1c, how it relates to glucose and how often to check, Preventing cardiovascular disease, including blood pressure goals, lipid goals, recommendations for cardioprotective medications, statins, and aspirin, and Prevention, early diagnostic measures and treatment of complications    Delphine Fletcher RDN, LD, [facility]    Time Spent: 20 minutes  Encounter Type: Individual    Any diabetes medication dose changes were made via the CDE Protocol per the patient's referring provider. A copy of this encounter was shared with the provider.

## 2025-01-21 NOTE — PROGRESS NOTES
Diabetes Self-Management Education & Support  Presents for: Follow-up    Type of Service: In Person Visit    ASSESSMENT:  Luis is doing well with diabetes management skills. He brought glucose readings today which show numbers in target most all of the time. Positive reinforcement given. He noticed higher glucose after left hip surgery, during the holidays and when he was sick.  Discussed risk reduction today.     Patient's most recent   Lab Results   Component Value Date    A1C 8.3 10/15/2024    A1C 6.3 07/08/2016     is not meeting goal of <7.0    Diabetes knowledge and skills assessment:   Patient is knowledgeable in diabetes management concepts related to: Healthy Eating, Monitoring, Taking Medication, and Problem Solving, Being Active and Reducing Risks    Continue education with the following diabetes management concepts: Education Complete    Based on learning assessment above, most appropriate setting for further diabetes education would be: Individual setting.      PLAN  A1c ordered today    Diabetes Medications:  Continue 25 mg Jardiance daily  as prescribed.     Glucose monitoring:  Check blood sugars fasting and 2 hours after meal. Pick one after meal test per day and rotate between meals.   OK to decrease testing to BID, 2-3 days per week.  Fasting and before meal targets:  mg/dL  2 hours after start of meal: <180 mg/dL    Meal Plan Recommendation:   Use portion control and plate planning method.  Carbohydrates to aim for at meals: 45-60 grams and snacks: 0-30 grams.    Exercise / activity plan:   Per PT after hip surgery    Follow-up: He will schedule in the spring per pt preference    See Care Plan for co-developed, patient-state behavior change goals.    SUBJECTIVE/OBJECTIVE:  Presents for: Follow-up  Accompanied by: Self  Diabetes education in the past 24mo: No  Focus of Visit: Monitoring, Taking Medication, Healthy Eating  Diabetes type: Type 2  How confident are you filling out medical forms  "by yourself:: Extremely  Transportation concerns: No  Other concerns:: None  Cultural Influences/Ethnic Background:  Not  or     Diabetes Symptoms & Complications:  Diabetes Related Symptoms: None  Weight trend: Other (see Comments)  Please elaborate:: Decreasing but I was trying to lose weight.  Symptom course: Stable     Patient Problem List and Family Medical History reviewed for relevant medical history, current medical status, and diabetes risk factors.    Vitals:  There were no vitals taken for this visit.  Estimated body mass index is 25.57 kg/m  as calculated from the following:    Height as of 8/16/24: 1.93 m (6' 3.98\").    Weight as of 11/7/24: 95.3 kg (210 lb).   Last 3 BP:   BP Readings from Last 3 Encounters:   10/15/24 132/80   08/16/24 (!) 150/86   05/31/23 (!) 150/96       History   Smoking Status    Never   Smokeless Tobacco    Never       Labs:  Lab Results   Component Value Date    A1C 8.3 10/15/2024    A1C 6.3 07/08/2016     Lab Results   Component Value Date     10/15/2024     07/08/2016     Lab Results   Component Value Date    LDL 96 08/16/2024     05/16/2016     HDL Cholesterol   Date Value Ref Range Status   05/16/2016 65 >39 mg/dL Final     Direct Measure HDL   Date Value Ref Range Status   08/16/2024 55 >=40 mg/dL Final   ]  GFR Estimate   Date Value Ref Range Status   10/15/2024 >90 >60 mL/min/1.73m2 Final     Comment:     eGFR calculated using 2021 CKD-EPI equation.   05/16/2016 >90  Non  GFR Calc   >60 mL/min/1.7m2 Final     GFR Estimate If Black   Date Value Ref Range Status   05/16/2016 >90   GFR Calc   >60 mL/min/1.7m2 Final     Lab Results   Component Value Date    CR 0.94 10/15/2024    CR 0.88 05/16/2016     No results found for: \"MICROALBUMIN\"    Healthy Eating:  Healthy Eating Assessed Today: Yes  Cultural/Mormon diet restrictions?: No  Do you have any food allergies or intolerances?: No  Meal planning/habits: " Smaller portions  Who cooks/prepares meals for you?: Spouse, Self  Who purchases food in  your home?: Self, Spouse  How many times a week on average do you eat food made away from home (restaurant/take-out)?: 2  Meals include: Lunch, Dinner, Morning Snack  Breakfast: coffee, fruit and yogurt OR sausage and yogurt: 1-2 CHO choices  Lunch: [place] or leftovers: 2-3 CHO choices OR chili  Dinner: Pork with 2/3 cup rice and peppers OR English muffin with cheese, eggs and sausage and 1 cup milk: 2-3 CHO choices OR chicken breast with rice and veggies  Other: Lost 15 lb in the past 5 months  Beverages: Water, Coffee, Diet soda  Has patient met with a dietitian in the past?: No    Being Active:  Being Active Assessed Today: Yes  Exercise:: Currently not exercising (Hip surgery 11/12/24)  Barrier to exercise: Physical limitation    Monitoring:  Monitoring Assessed Today: Yes  Did patient bring glucose meter to appointment? : Yes  Times checking blood sugar at home (number): 2  Times checking blood sugar at home (per): Day  Blood glucose trend: Decreasing    Date Breakfast  Lunch  Dinner  Bedtime    Before After Before After Before After    1/21 128         1/20 140     141    1/19 116   123  151    1/18   125       1/17 128 127    127    1/16 131     114    1/15  141    135        Taking Medications:  Diabetes Medication(s)       Sodium-Glucose Co-Transporter 2 (SGLT2) Inhibitors       empagliflozin (JARDIANCE) 25 MG TABS tablet Take 1 tablet (25 mg) by mouth daily.     empagliflozin (JARDIANCE) 10 MG TABS tablet Take 1 tablet (10 mg) by mouth daily.            Taking Medication Assessed Today: Yes  Current Treatments: None, Diet, Oral Medication (taken by mouth)  Problems taking diabetes medications regularly?: No  Diabetes medication side effects?: No    Problem Solving:  Problem Solving Assessed Today: Yes  Is the patient at risk for hypoglycemia?: No    Reducing Risks:  Reducing Risks Assessed Today: Yes  Diabetes  Risks: Age over 45 years, Family History (Father)  CAD Risks: Male sex, Diabetes Mellitus  Has dilated eye exam at least once a year?: No  Sees dentist every 6 months?: No  Feet checked by healthcare provider in the last year?: Yes    Healthy Coping:  Healthy Coping Assessed Today: Yes  Emotional response to diabetes: Ready to learn  Informal Support system:: Family, Friends, Significant other  Stage of change: ACTION (Actively working towards change)  Support resources: Websites  Patient Activation Measure Survey Score:      11/29/2012    10:00 AM   ARBEN Score (Last Two)   ARBEN Raw Score 52   Activation Score 100   ARBEN Level 4       Care Plan and Education Provided:  Healthy Eating: Balanced meals and Plate planning method, Being Active: Precautions to take with exercise and Relationship of activity to glucose, Monitoring: Frequency of monitoring, Individual glucose targets, and Log and interpret results, Taking Medication: Action of prescribed medication(s), Problem Solving: When to call a health care provider, and Reducing Risks: Complications of diabetes, Dental care, Eye care, Foot care, Goal for A1c, how it relates to glucose and how often to check, Preventing cardiovascular disease, including blood pressure goals, lipid goals, recommendations for cardioprotective medications, statins, and aspirin, and Prevention, early diagnostic measures and treatment of complications    Delphine Fletcher RDN, LD, [facility]    Time Spent: 20 minutes  Encounter Type: Individual    Any diabetes medication dose changes were made via the CDE Protocol per the patient's referring provider. A copy of this encounter was shared with the provider.

## 2025-01-21 NOTE — Clinical Note
1/21/2025         RE: Mark Roy  [address] [place] [address]        Dear Colleague,    Thank you for referring your patient, Mark Roy, to the [facility]. Please see a copy of my visit note below.    Diabetes Self-Management Education & Support  Presents for: Follow-up    Type of Service: In Person Visit    ASSESSMENT:  Luis is doing well with diabetes management skills. He brought glucose readings today which show numbers in target most all of the time. Positive reinforcement given. He noticed higher glucose after left hip surgery, during the holidays and when he was sick.  Discussed risk reduction today.     Patient's most recent   Lab Results   Component Value Date    A1C 8.3 10/15/2024    A1C 6.3 07/08/2016     is not meeting goal of <7.0    Diabetes knowledge and skills assessment:   Patient is knowledgeable in diabetes management concepts related to: Healthy Eating, Monitoring, Taking Medication, and Problem Solving, Being Active and Reducing Risks    Continue education with the following diabetes management concepts: Education Complete    Based on learning assessment above, most appropriate setting for further diabetes education would be: Individual setting.      PLAN  A1c ordered today    Diabetes Medications:  Continue 25 mg Jardiance daily  as prescribed.     Glucose monitoring:  Check blood sugars fasting and 2 hours after meal. Pick one after meal test per day and rotate between meals.   OK to decrease testing to BID, 2-3 days per week.  Fasting and before meal targets:  mg/dL  2 hours after start of meal: <180 mg/dL    Meal Plan Recommendation:   Use portion control and plate planning method.  Carbohydrates to aim for at meals: 45-60 grams and snacks: 0-30 grams.    Exercise / activity plan:   Per PT after hip surgery    Follow-up: He will schedule in the spring per pt preference    See Care Plan for co-developed, patient-state behavior change  "goals.    SUBJECTIVE/OBJECTIVE:  Presents for: Follow-up  Accompanied by: Self  Diabetes education in the past 24mo: No  Focus of Visit: Monitoring, Taking Medication, Healthy Eating  Diabetes type: Type 2  How confident are you filling out medical forms by yourself:: Extremely  Transportation concerns: No  Other concerns:: None  Cultural Influences/Ethnic Background:  Not  or     Diabetes Symptoms & Complications:  Diabetes Related Symptoms: None  Weight trend: Other (see Comments)  Please elaborate:: Decreasing but I was trying to lose weight.  Symptom course: Stable     Patient Problem List and Family Medical History reviewed for relevant medical history, current medical status, and diabetes risk factors.    Vitals:  There were no vitals taken for this visit.  Estimated body mass index is 25.57 kg/m  as calculated from the following:    Height as of 8/16/24: 1.93 m (6' 3.98\").    Weight as of 11/7/24: 95.3 kg (210 lb).   Last 3 BP:   BP Readings from Last 3 Encounters:   10/15/24 132/80   08/16/24 (!) 150/86   05/31/23 (!) 150/96       History   Smoking Status     Never   Smokeless Tobacco     Never       Labs:  Lab Results   Component Value Date    A1C 8.3 10/15/2024    A1C 6.3 07/08/2016     Lab Results   Component Value Date     10/15/2024     07/08/2016     Lab Results   Component Value Date    LDL 96 08/16/2024     05/16/2016     HDL Cholesterol   Date Value Ref Range Status   05/16/2016 65 >39 mg/dL Final     Direct Measure HDL   Date Value Ref Range Status   08/16/2024 55 >=40 mg/dL Final   ]  GFR Estimate   Date Value Ref Range Status   10/15/2024 >90 >60 mL/min/1.73m2 Final     Comment:     eGFR calculated using 2021 CKD-EPI equation.   05/16/2016 >90  Non  GFR Calc   >60 mL/min/1.7m2 Final     GFR Estimate If Black   Date Value Ref Range Status   05/16/2016 >90   GFR Calc   >60 mL/min/1.7m2 Final     Lab Results   Component Value Date    " "CR 0.94 10/15/2024    CR 0.88 05/16/2016     No results found for: \"MICROALBUMIN\"    Healthy Eating:  Healthy Eating Assessed Today: Yes  Cultural/Hindu diet restrictions?: No  Do you have any food allergies or intolerances?: No  Meal planning/habits: Smaller portions  Who cooks/prepares meals for you?: Spouse, Self  Who purchases food in  your home?: Self, Spouse  How many times a week on average do you eat food made away from home (restaurant/take-out)?: 2  Meals include: Lunch, Dinner, Morning Snack  Breakfast: coffee, fruit and yogurt OR sausage and yogurt: 1-2 CHO choices  Lunch: [place] or leftovers: 2-3 CHO choices OR chili  Dinner: Pork with 2/3 cup rice and peppers OR English muffin with cheese, eggs and sausage and 1 cup milk: 2-3 CHO choices OR chicken breast with rice and veggies  Other: Lost 15 lb in the past 5 months  Beverages: Water, Coffee, Diet soda  Has patient met with a dietitian in the past?: No    Being Active:  Being Active Assessed Today: Yes  Exercise:: Currently not exercising (Hip surgery 11/12/24)  Barrier to exercise: Physical limitation    Monitoring:  Monitoring Assessed Today: Yes  Did patient bring glucose meter to appointment? : Yes  Times checking blood sugar at home (number): 2  Times checking blood sugar at home (per): Day  Blood glucose trend: Decreasing    Date Breakfast  Lunch  Dinner  Bedtime    Before After Before After Before After    1/21 128         1/20 140     141    1/19 116   123  151    1/18   125       1/17 128 127    127    1/16 131     114    1/15  141    135        Taking Medications:  Diabetes Medication(s)       Sodium-Glucose Co-Transporter 2 (SGLT2) Inhibitors       empagliflozin (JARDIANCE) 25 MG TABS tablet Take 1 tablet (25 mg) by mouth daily.     empagliflozin (JARDIANCE) 10 MG TABS tablet Take 1 tablet (10 mg) by mouth daily.            Taking Medication Assessed Today: Yes  Current Treatments: None, Diet, Oral Medication (taken by mouth)  Problems " taking diabetes medications regularly?: No  Diabetes medication side effects?: No    Problem Solving:  Problem Solving Assessed Today: Yes  Is the patient at risk for hypoglycemia?: No    Reducing Risks:  Reducing Risks Assessed Today: Yes  Diabetes Risks: Age over 45 years, Family History (Father)  CAD Risks: Male sex, Diabetes Mellitus  Has dilated eye exam at least once a year?: No  Sees dentist every 6 months?: No  Feet checked by healthcare provider in the last year?: Yes    Healthy Coping:  Healthy Coping Assessed Today: Yes  Emotional response to diabetes: Ready to learn  Informal Support system:: Family, Friends, Significant other  Stage of change: ACTION (Actively working towards change)  Support resources: Websites  Patient Activation Measure Survey Score:      11/29/2012    10:00 AM   ARBEN Score (Last Two)   ARBEN Raw Score 52   Activation Score 100   ARBEN Level 4       Care Plan and Education Provided:  Healthy Eating: Balanced meals and Plate planning method, Being Active: Precautions to take with exercise and Relationship of activity to glucose, Monitoring: Frequency of monitoring, Individual glucose targets, and Log and interpret results, Taking Medication: Action of prescribed medication(s), Problem Solving: When to call a health care provider, and Reducing Risks: Complications of diabetes, Dental care, Eye care, Foot care, Goal for A1c, how it relates to glucose and how often to check, Preventing cardiovascular disease, including blood pressure goals, lipid goals, recommendations for cardioprotective medications, statins, and aspirin, and Prevention, early diagnostic measures and treatment of complications    Delphine Fletcher RDN, LD, [facility]    Time Spent: 20 minutes  Encounter Type: Individual    Any diabetes medication dose changes were made via the CDE Protocol per the patient's referring provider. A copy of this encounter was shared with the provider.

## 2025-04-13 DIAGNOSIS — I10 BENIGN ESSENTIAL HYPERTENSION: ICD-10-CM

## 2025-04-14 DIAGNOSIS — E11.9 TYPE 2 DIABETES MELLITUS WITHOUT COMPLICATION, WITHOUT LONG-TERM CURRENT USE OF INSULIN (H): ICD-10-CM

## 2025-04-14 RX ORDER — LISINOPRIL 10 MG/1
10 TABLET ORAL DAILY
Qty: 90 TABLET | Refills: 0 | Status: SHIPPED | OUTPATIENT
Start: 2025-04-14

## 2025-05-03 ENCOUNTER — HEALTH MAINTENANCE LETTER (OUTPATIENT)
Age: 60
End: 2025-05-03

## 2025-07-12 DIAGNOSIS — I10 BENIGN ESSENTIAL HYPERTENSION: ICD-10-CM

## 2025-07-14 RX ORDER — LISINOPRIL 10 MG/1
10 TABLET ORAL DAILY
Qty: 90 TABLET | Refills: 0 | Status: SHIPPED | OUTPATIENT
Start: 2025-07-14

## 2025-08-16 ENCOUNTER — HEALTH MAINTENANCE LETTER (OUTPATIENT)
Age: 60
End: 2025-08-16